# Patient Record
Sex: FEMALE | Race: WHITE | NOT HISPANIC OR LATINO
[De-identification: names, ages, dates, MRNs, and addresses within clinical notes are randomized per-mention and may not be internally consistent; named-entity substitution may affect disease eponyms.]

---

## 2017-03-06 ENCOUNTER — APPOINTMENT (OUTPATIENT)
Dept: OBGYN | Facility: CLINIC | Age: 27
End: 2017-03-06

## 2017-12-16 ENCOUNTER — TRANSCRIPTION ENCOUNTER (OUTPATIENT)
Age: 27
End: 2017-12-16

## 2019-07-03 ENCOUNTER — TRANSCRIPTION ENCOUNTER (OUTPATIENT)
Age: 29
End: 2019-07-03

## 2019-07-16 ENCOUNTER — APPOINTMENT (OUTPATIENT)
Dept: UROLOGY | Facility: CLINIC | Age: 29
End: 2019-07-16
Payer: COMMERCIAL

## 2019-07-16 VITALS
SYSTOLIC BLOOD PRESSURE: 119 MMHG | BODY MASS INDEX: 19.12 KG/M2 | TEMPERATURE: 98.1 F | HEIGHT: 64 IN | HEART RATE: 107 BPM | OXYGEN SATURATION: 97 % | WEIGHT: 112 LBS | DIASTOLIC BLOOD PRESSURE: 82 MMHG

## 2019-07-16 DIAGNOSIS — Z78.9 OTHER SPECIFIED HEALTH STATUS: ICD-10-CM

## 2019-07-16 DIAGNOSIS — Z80.8 FAMILY HISTORY OF MALIGNANT NEOPLASM OF OTHER ORGANS OR SYSTEMS: ICD-10-CM

## 2019-07-16 DIAGNOSIS — R10.32 LEFT LOWER QUADRANT PAIN: ICD-10-CM

## 2019-07-16 DIAGNOSIS — N39.0 URINARY TRACT INFECTION, SITE NOT SPECIFIED: ICD-10-CM

## 2019-07-16 PROCEDURE — 99204 OFFICE O/P NEW MOD 45 MIN: CPT

## 2019-07-16 RX ORDER — DEXTROAMPHETAMINE SACCHARATE, AMPHETAMINE ASPARTATE, DEXTROAMPHETAMINE SULFATE, AND AMPHETAMINE SULFATE 3.75; 3.75; 3.75; 3.75 MG/1; MG/1; MG/1; MG/1
TABLET ORAL
Refills: 0 | Status: ACTIVE | COMMUNITY

## 2019-07-16 RX ORDER — ALPRAZOLAM 2 MG/1
TABLET ORAL
Refills: 0 | Status: ACTIVE | COMMUNITY

## 2019-07-16 NOTE — REVIEW OF SYSTEMS
[Earache] : earache [Abdominal Pain] : abdominal pain [Genital bacterial infection] : genital bacterial infection [Genital yeast infection] : genital yeast infection [Pain during urination] : pain during urination [Blood in urine that you can see] : blood visible in urine [Told you have blood in urine on a urine test] : told blood was present in a urine test [Strong urge to urinate] : strong urge to urinate [Dizziness] : dizziness [Anxiety] : anxiety [Hot Flashes] : hot flashes [Feelings Of Weakness] : feelings of weakness [Swollen Glands] : swollen glands [Negative] : Integumentary

## 2019-07-16 NOTE — LETTER BODY
[Dear  ___] : Dear  [unfilled], [Consult Letter:] : I had the pleasure of evaluating your patient, [unfilled]. [Please see my note below.] : Please see my note below. [Consult Closing:] : Thank you very much for allowing me to participate in the care of this patient.  If you have any questions, please do not hesitate to contact me. [FreeTextEntry1] : ALISSON JENKINS is a 29 year old F who presents with a 1 d h/o GH with passage of blood clots on 7/3/19 and dysuria. Urine culture was negative. Apparently, a few wks prior, though there was no gross hematuria, she had severe pain in pelvis and into vagina with dysuria as well. She passed either debris and/or blood clots and has no known prior h/o stones. Other than 2 wks ago, she has never had hematuria before and denies any childhood, febrile or complicated UTI's and states she had had no UTI at all in over a yr, but states that recently, her GYN tells her the urine looks "terrible" and she often has wbc's etc.\par \par She is otherwise healthy and at baseline has no real bothersome urinary symptoms. There is also no FH of  cancers, stones, ESRD,  anomalies etc.\par \par Patient and I had a lengthy discussion regarding gross hematuria, possible etiologies and the required work up. Due to her GH and pain, she may have a stone and a non contrast CT has been ordered and she will need a cystoscopy.  Appropriate urine tests have been sent and she also is aware that a urine dipstick can be falsely positive with wbc, epithelial cells, even bacteria, that may be normal colonizers. Once this evaluation is complete, should she develop symptoms of  a simple lower urinary tract infection, she should come in for a catheterized specimen for a true microscopic analysis and culture, as the dip in urgicenter was positive and yet there was no growth.\par In 12/17, she had some staph in urine which is often a normal colonizer. She demonstrated understanding of difference between bacilluria with no pyuria and therefore, no infection, and a true acute UTI.\par  [FreeTextEntry3] : Sincerely,\par \par Wilma Tucker MD\par [facility]\par

## 2019-07-16 NOTE — ASSESSMENT
[FreeTextEntry1] : Patient and I had a lengthy discussion regarding gross hematuria, possible etiologies and the required work up. Due to her GH and pain, she may have a stone and a non contrast CT has been ordered and she will need a cystoscopy.  Appropriate urine tests have been sent and she also is aware that a urine dipstick can be falsely positive with wbc, epithelial cells, even bacteria, that may be normal colonizers. Once this evaluation is complete, should she develop symptoms of  a simple lower urinary tract infection, she should come in for a catheterized specimen for a true microscopic analysis and culture, as the dip in urgicenter was positive and yet there was no growth.\par In 12/17, she had some staph in urine which is often a normal colonizer. She demonstrated understanding of difference between bacilluria with no pyuria and therefore, no infection, and a true acute UTI.

## 2019-07-16 NOTE — HISTORY OF PRESENT ILLNESS
[FreeTextEntry1] : ALISSON JENKINS is a 29 year old F who presents with a 1 d h/o GH with passage of blood clots on 7/3/19 and dysuria. Urine culture was negative. Apparently, a few wks prior, though there was no gross hematuria, she had severe pain in pelvis and into vagina with dysuria as well. She passed either debris and/or blood clots and has no known prior h/o stones. Other than 2 wks ago, she has never had hematuria before and denies any childhood, febrile or complicated UTI's and states she had had no UTI at all in over a yr, but states that recently, her GYN tells her the urine looks "terrible" and she often has wbc's etc.\par \par She is otherwise healthy and at baseline has no real bothersome urinary symptoms. There is also no FH of  cancers, stones, ESRD,  anomalies etc.\par

## 2019-07-16 NOTE — PHYSICAL EXAM
[General Appearance - Well Developed] : well developed [General Appearance - Well Nourished] : well nourished [Normal Appearance] : normal appearance [Well Groomed] : well groomed [General Appearance - In No Acute Distress] : no acute distress [Edema] : no peripheral edema [Respiration, Rhythm And Depth] : normal respiratory rhythm and effort [Exaggerated Use Of Accessory Muscles For Inspiration] : no accessory muscle use [Abdomen Soft] : soft [Abdomen Tenderness] : non-tender [Costovertebral Angle Tenderness] : no ~M costovertebral angle tenderness [Urinary Bladder Findings] : the bladder was normal on palpation [Normal Station and Gait] : the gait and station were normal for the patient's age [] : no rash [No Focal Deficits] : no focal deficits [Oriented To Time, Place, And Person] : oriented to person, place, and time [Affect] : the affect was normal [Mood] : the mood was normal [Not Anxious] : not anxious [No Palpable Adenopathy] : no palpable adenopathy return to ED if symptoms worsen, persist or questions arise/radiology results/need for outpatient follow-up

## 2019-07-17 LAB
APPEARANCE: CLEAR
BACTERIA: NEGATIVE
BILIRUBIN URINE: NEGATIVE
BLOOD URINE: NEGATIVE
COLOR: COLORLESS
GLUCOSE QUALITATIVE U: NEGATIVE
HYALINE CASTS: 1 /LPF
KETONES URINE: NEGATIVE
LEUKOCYTE ESTERASE URINE: NEGATIVE
MICROSCOPIC-UA: NORMAL
NITRITE URINE: NEGATIVE
PH URINE: 6.5
PROTEIN URINE: NEGATIVE
RED BLOOD CELLS URINE: 0 /HPF
SPECIFIC GRAVITY URINE: 1
SQUAMOUS EPITHELIAL CELLS: 0 /HPF
URINE CYTOLOGY: NORMAL
UROBILINOGEN URINE: NORMAL
WHITE BLOOD CELLS URINE: 0 /HPF

## 2019-07-18 LAB — BACTERIA UR CULT: NORMAL

## 2019-07-24 ENCOUNTER — APPOINTMENT (OUTPATIENT)
Dept: UROLOGY | Facility: CLINIC | Age: 29
End: 2019-07-24
Payer: COMMERCIAL

## 2019-07-24 VITALS
SYSTOLIC BLOOD PRESSURE: 113 MMHG | DIASTOLIC BLOOD PRESSURE: 82 MMHG | RESPIRATION RATE: 16 BRPM | TEMPERATURE: 98.1 F | HEART RATE: 114 BPM

## 2019-07-24 PROCEDURE — 99211 OFF/OP EST MAY X REQ PHY/QHP: CPT | Mod: 25

## 2019-07-24 PROCEDURE — 52000 CYSTOURETHROSCOPY: CPT

## 2019-07-25 LAB
APPEARANCE: CLEAR
BACTERIA: NEGATIVE
BILIRUBIN URINE: NEGATIVE
BLOOD URINE: NEGATIVE
COLOR: COLORLESS
GLUCOSE QUALITATIVE U: NEGATIVE
HYALINE CASTS: 0 /LPF
KETONES URINE: NEGATIVE
LEUKOCYTE ESTERASE URINE: NEGATIVE
MICROSCOPIC-UA: NORMAL
NITRITE URINE: NEGATIVE
PH URINE: 6.5
PROTEIN URINE: NEGATIVE
RED BLOOD CELLS URINE: 0 /HPF
SPECIFIC GRAVITY URINE: 1.01
SQUAMOUS EPITHELIAL CELLS: 1 /HPF
UROBILINOGEN URINE: NORMAL
WHITE BLOOD CELLS URINE: 0 /HPF

## 2019-07-26 ENCOUNTER — TRANSCRIPTION ENCOUNTER (OUTPATIENT)
Age: 29
End: 2019-07-26

## 2019-07-26 LAB — BACTERIA UR CULT: NORMAL

## 2019-07-29 LAB — URINE CYTOLOGY: NORMAL

## 2019-08-01 ENCOUNTER — FORM ENCOUNTER (OUTPATIENT)
Age: 29
End: 2019-08-01

## 2019-08-02 ENCOUNTER — OUTPATIENT (OUTPATIENT)
Dept: OUTPATIENT SERVICES | Facility: HOSPITAL | Age: 29
LOS: 1 days | End: 2019-08-02
Payer: COMMERCIAL

## 2019-08-02 ENCOUNTER — APPOINTMENT (OUTPATIENT)
Dept: CT IMAGING | Facility: CLINIC | Age: 29
End: 2019-08-02
Payer: COMMERCIAL

## 2019-08-02 DIAGNOSIS — R31.0 GROSS HEMATURIA: ICD-10-CM

## 2019-08-02 PROCEDURE — 74176 CT ABD & PELVIS W/O CONTRAST: CPT | Mod: 26

## 2019-08-02 PROCEDURE — 74176 CT ABD & PELVIS W/O CONTRAST: CPT

## 2019-08-05 PROBLEM — R10.32 LEFT LOWER QUADRANT PAIN: Status: ACTIVE | Noted: 2019-07-16

## 2019-08-22 ENCOUNTER — APPOINTMENT (OUTPATIENT)
Dept: UROLOGY | Facility: CLINIC | Age: 29
End: 2019-08-22
Payer: COMMERCIAL

## 2019-08-22 VITALS
HEART RATE: 86 BPM | DIASTOLIC BLOOD PRESSURE: 68 MMHG | BODY MASS INDEX: 18.78 KG/M2 | HEIGHT: 64 IN | RESPIRATION RATE: 15 BRPM | WEIGHT: 110 LBS | SYSTOLIC BLOOD PRESSURE: 97 MMHG

## 2019-08-22 PROCEDURE — 99214 OFFICE O/P EST MOD 30 MIN: CPT

## 2019-08-22 NOTE — ASSESSMENT
[FreeTextEntry1] : Patient and I had a lengthy discussion regarding her findings. We had sent out 2 sets of urine studies, all with normal results. A noncontrast CT scan was negative and cystoscopy was negative. In our office today the urine is yellow, light and clear, but we will repeat her urine studies, and vaginal swabs were obtained to rule out yeast, BV, trichomonas, Mycoplasma and Ureaplasma. This is very likely vaginal in nature. I suggested she see her gynecologist and maybe there is a role for transvaginal ultrasound. Maybe she has a cervical polyp or possibly it is related to the pill and uterine shedding.  I am unsure as to the etiology of her periodic vaginal bleeding with blood on TP and/or urinary bleeding.

## 2019-08-22 NOTE — LETTER BODY
[Dear  ___] : Dear  [unfilled], [Consult Letter:] : I had the pleasure of evaluating your patient, [unfilled]. [Consult Closing:] : Thank you very much for allowing me to participate in the care of this patient.  If you have any questions, please do not hesitate to contact me. [FreeTextEntry1] : ALISSON JENKINS is a 29 year old F who presented an hr- 2 hrs after we spoke by phone via messaging service.  She was having a panic attack and felt dizzy becausee she kept seeing blood on TP. Last night before bedtime, felt a need to void, with a deep pelvic pain and "itching" sensation. Every time she wiped, there was blood on TP and she kept having to urinate. No passage of blood clots, but then later some blood into urine.\par \par Same OCP's since 2/19. Periods are regular and light. Last 2 days only. She is up to date with gyn and is looking for a new one. Denies n/v/f/c and demonstrated no flank pain. Voided clear yellow urine and c/o continued pruritus. Took one tablet of fluconazole.\par \par Patient and I had a lengthy discussion regarding her findings. We had sent out 2 sets of urine studies, all with normal results. A noncontrast CT scan was negative and cystoscopy was negative. In our office today the urine is yellow, light and clear, but we will repeat her urine studies, and vaginal swabs were obtained to rule out yeast, BV, trichomonas, Mycoplasma and Ureaplasma. This is very likely vaginal in nature. I suggested she see her gynecologist and maybe there is a role for transvaginal ultrasound. Maybe she has a cervical polyp or possibly it is related to the pill and uterine shedding.  I am unsure as to the etiology of her periodic vaginal bleeding with blood on TP and/or urinary bleeding. [FreeTextEntry3] : Sincerely,\par \par Wilma Tucker MD\par [facility]\par

## 2019-08-22 NOTE — PHYSICAL EXAM
[Abdomen Soft] : soft [Abdomen Hernia] : no hernia was discovered [Costovertebral Angle Tenderness] : no ~M costovertebral angle tenderness [Urethral Meatus] : normal urethra [Urinary Bladder Findings] : the bladder was normal on palpation [Vagina] : normal vaginal exam [Cervix] : normal cervix [FreeTextEntry1] : nl vaginal discharge; soft mobile cervix w/ no CMT. No lesions.

## 2019-08-22 NOTE — HISTORY OF PRESENT ILLNESS
[FreeTextEntry1] : ALISSON JENKINS is a 29 year old F who presented an hr- 2 hrs after we spoke by phone via messaging service.  She was having a panic attack and felt dizzy becausee she kept seeing blood on TP. Last night before bedtime, felt a need to void, with a deep pelvic pain and "itching" sensation. Every time she wiped, there was blood on TP and she kept having to urinate. No passage of blood clots, but then later some blood into urine.\par \par Same OCP's since 2/19. Periods are regular and light. Last 2 days only. She is up to date with gyn and is looking for a new one. Denies n/v/f/c and demonstrated no flank pain. Voided clear yellow urine and c/o continued pruritus. Took one tablet of fluconazole.

## 2019-08-23 LAB
APPEARANCE: CLEAR
BACTERIA: NEGATIVE
BILIRUBIN URINE: NEGATIVE
BLOOD URINE: ABNORMAL
COLOR: COLORLESS
GLUCOSE QUALITATIVE U: NEGATIVE
HYALINE CASTS: 0 /LPF
KETONES URINE: ABNORMAL
LEUKOCYTE ESTERASE URINE: ABNORMAL
MICROSCOPIC-UA: NORMAL
NITRITE URINE: NEGATIVE
PH URINE: 6
PROTEIN URINE: NEGATIVE
RED BLOOD CELLS URINE: 1 /HPF
SPECIFIC GRAVITY URINE: 1
SQUAMOUS EPITHELIAL CELLS: 0 /HPF
URINE CYTOLOGY: NORMAL
UROBILINOGEN URINE: NORMAL
WHITE BLOOD CELLS URINE: 28 /HPF

## 2019-08-26 DIAGNOSIS — N76.0 ACUTE VAGINITIS: ICD-10-CM

## 2019-08-26 LAB
BACTERIA GENITAL AEROBE CULT: NORMAL
BACTERIA UR CULT: NORMAL
CANDIDA VAG CYTO: NOT DETECTED
G VAGINALIS+PREV SP MTYP VAG QL MICRO: DETECTED
T VAGINALIS VAG QL WET PREP: NOT DETECTED

## 2019-08-26 RX ORDER — METRONIDAZOLE 500 MG/1
500 TABLET ORAL
Qty: 14 | Refills: 0 | Status: ACTIVE | COMMUNITY
Start: 2019-08-26 | End: 1900-01-01

## 2019-08-27 NOTE — LETTER BODY
[Dear  ___] : Dear  [unfilled], [Consult Letter:] : I had the pleasure of evaluating your patient, [unfilled]. [Please see my note below.] : Please see my note below. [Consult Closing:] : Thank you very much for allowing me to participate in the care of this patient.  If you have any questions, please do not hesitate to contact me. [FreeTextEntry1] : As you know, I had met your patient after an episode of gross hematuria. On 7/16/19, her microscopic analysis, culture and cytology were negative and cystoscopy today was normal. We are awaiting her CT scan to r/o any stones. \par \par I will repeat urine studies as she is quite anxious about it. It is very possible that she had passed a small stone or crystals and at this point, there would be no evidence of recent passage.  She also knows to contact me right away if she has an episode of gross bleeding again.\par \par I will keep you apprised of any further findings. [FreeTextEntry3] : Sincerely,\par \par Wilma Tucker MD\par [facility]\par

## 2019-08-27 NOTE — PHYSICAL EXAM
[Abdomen Soft] : soft [Costovertebral Angle Tenderness] : no ~M costovertebral angle tenderness [Urethral Meatus] : normal urethra [Urinary Bladder Findings] : the bladder was normal on palpation [Vagina] : normal vaginal exam [Cervix] : normal cervix [FreeTextEntry1] : nl vaginal discharge; no blood on finger with bimanual

## 2019-09-05 DIAGNOSIS — Z71.1 PERSON WITH FEARED HEALTH COMPLAINT IN WHOM NO DIAGNOSIS IS MADE: ICD-10-CM

## 2019-09-05 LAB
MYCOPLASMA HOMINIS CULTURE: NORMAL
UREA SPECIMEN SOURCE: NORMAL
UREAPLASMA CULTURE: ABNORMAL
UREAPLASMA, PRELIMINARY CULTURE: NORMAL

## 2019-09-05 RX ORDER — DOXYCYCLINE 100 MG/1
100 TABLET, FILM COATED ORAL
Qty: 20 | Refills: 0 | Status: ACTIVE | COMMUNITY
Start: 2019-09-05 | End: 1900-01-01

## 2020-10-01 ENCOUNTER — TRANSCRIPTION ENCOUNTER (OUTPATIENT)
Age: 30
End: 2020-10-01

## 2020-10-05 ENCOUNTER — APPOINTMENT (OUTPATIENT)
Dept: UROLOGY | Facility: CLINIC | Age: 30
End: 2020-10-05
Payer: COMMERCIAL

## 2020-10-05 VITALS
SYSTOLIC BLOOD PRESSURE: 121 MMHG | DIASTOLIC BLOOD PRESSURE: 83 MMHG | RESPIRATION RATE: 20 BRPM | HEIGHT: 64 IN | BODY MASS INDEX: 18.78 KG/M2 | HEART RATE: 121 BPM | WEIGHT: 110 LBS

## 2020-10-05 VITALS — TEMPERATURE: 97.4 F

## 2020-10-05 PROCEDURE — 99214 OFFICE O/P EST MOD 30 MIN: CPT

## 2020-10-06 NOTE — HISTORY OF PRESENT ILLNESS
[FreeTextEntry1] : 29yo female with cc of gross hematuria. Pt was seen last year for this. Had CT and cysto that was negative. Had similar episode again this summer and again only noted blood with urination--none on wiping. Saw gyn and had negative eval. Sx resolved within one day. Again had blood last week. Lasted all day and then resolved. Two days prior saw blood when she wiped. After multiple times of voiding blood, will then get some terminal dysuria but no change in frequency and urgency and cultures have been negative by report. \par \par No tobacco hx. Smokes marijuana daily. No exposure hx. No family hx of  malignancy. No sickle cell or thalassemias. No NSAID use. Has gotten period shortly after episode--No hx of endometriosis or painful periods. No excessive exercise.

## 2020-10-06 NOTE — ASSESSMENT
[FreeTextEntry1] : ? gross hematuria. Sx have been recurrent and by hx sound like true hematuria. Needs eval at time of sx given recent negative CTU and cysto. \par --, UA/UCx, GYN and cysto at the time of bleeding to localize

## 2020-10-06 NOTE — PHYSICAL EXAM
[General Appearance - Well Developed] : well developed [General Appearance - Well Nourished] : well nourished [General Appearance - In No Acute Distress] : no acute distress [Abdomen Soft] : soft [Abdomen Tenderness] : non-tender [Costovertebral Angle Tenderness] : no ~M costovertebral angle tenderness [Edema] : no peripheral edema [Exaggerated Use Of Accessory Muscles For Inspiration] : no accessory muscle use [Oriented To Time, Place, And Person] : oriented to person, place, and time [Normal Station and Gait] : the gait and station were normal for the patient's age [No Focal Deficits] : no focal deficits [Cervical Lymph Nodes Enlarged Anterior Bilaterally] : anterior cervical [Supraclavicular Lymph Nodes Enlarged Bilaterally] : supraclavicular

## 2020-10-28 ENCOUNTER — RESULT REVIEW (OUTPATIENT)
Age: 30
End: 2020-10-28

## 2020-10-31 ENCOUNTER — NON-APPOINTMENT (OUTPATIENT)
Age: 30
End: 2020-10-31

## 2021-09-09 ENCOUNTER — APPOINTMENT (OUTPATIENT)
Dept: UROLOGY | Facility: CLINIC | Age: 31
End: 2021-09-09
Payer: COMMERCIAL

## 2021-09-09 VITALS — HEART RATE: 78 BPM | DIASTOLIC BLOOD PRESSURE: 82 MMHG | SYSTOLIC BLOOD PRESSURE: 119 MMHG

## 2021-09-09 VITALS — DIASTOLIC BLOOD PRESSURE: 63 MMHG | TEMPERATURE: 98.1 F | SYSTOLIC BLOOD PRESSURE: 87 MMHG

## 2021-09-09 VITALS
DIASTOLIC BLOOD PRESSURE: 80 MMHG | BODY MASS INDEX: 18.88 KG/M2 | TEMPERATURE: 98.1 F | SYSTOLIC BLOOD PRESSURE: 112 MMHG | OXYGEN SATURATION: 100 % | WEIGHT: 110 LBS | HEART RATE: 94 BPM

## 2021-09-09 DIAGNOSIS — F98.8 OTHER SPECIFIED BEHAVIORAL AND EMOTIONAL DISORDERS WITH ONSET USUALLY OCCURRING IN CHILDHOOD AND ADOLESCENCE: ICD-10-CM

## 2021-09-09 DIAGNOSIS — F41.9 ANXIETY DISORDER, UNSPECIFIED: ICD-10-CM

## 2021-09-09 DIAGNOSIS — R82.71 BACTERIURIA: ICD-10-CM

## 2021-09-09 PROCEDURE — 99213 OFFICE O/P EST LOW 20 MIN: CPT | Mod: 25

## 2021-09-09 PROCEDURE — 52000 CYSTOURETHROSCOPY: CPT

## 2021-09-09 RX ORDER — CIPROFLOXACIN HYDROCHLORIDE 500 MG/1
500 TABLET, FILM COATED ORAL
Qty: 2 | Refills: 0 | Status: ACTIVE | COMMUNITY
Start: 2021-09-09

## 2021-09-09 RX ORDER — CIPROFLOXACIN HYDROCHLORIDE 500 MG/1
500 TABLET, FILM COATED ORAL
Qty: 10 | Refills: 0 | Status: ACTIVE | COMMUNITY
Start: 2021-09-09 | End: 1900-01-01

## 2021-09-09 RX ORDER — PHENAZOPYRIDINE HYDROCHLORIDE 100 MG/1
100 TABLET ORAL EVERY 6 HOURS
Qty: 2 | Refills: 0 | Status: ACTIVE | COMMUNITY
Start: 2021-09-09

## 2021-09-09 RX ORDER — NORETHINDRONE ACETATE, ETHINYL ESTRADIOL AND FERROUS FUMARATE 1MG-20(24)
1-20 KIT ORAL
Refills: 0 | Status: DISCONTINUED | COMMUNITY
End: 2021-09-09

## 2021-09-09 RX ADMIN — PHENAZOPYRIDINE HYDROCHLORIDE 0 MG: 100 TABLET, FILM COATED ORAL at 00:00

## 2021-09-09 RX ADMIN — CIPROFLOXACIN HYDROCHLORIDE 0 MG: 500 TABLET, FILM COATED ORAL at 00:00

## 2021-09-09 NOTE — ASSESSMENT
[FreeTextEntry1] : needs UA, Cx and cytology \par CTU - Rx given\par cystoscopy but urine already clearing, so concerned I won't see what side it is coming from: very angry looking bladder, ?inflammatory\par \par Urine for fungal cx as well due to some "snow" and debris in bladder.\par \par ? IC - may need bladder biopsies if all of above negative\par F/u 3-4 wks

## 2021-09-09 NOTE — LETTER BODY
[Dear  ___] : Dear  [unfilled], [Consult Letter:] : I had the pleasure of evaluating your patient, [unfilled]. [Please see my note below.] : Please see my note below. [Consult Closing:] : Thank you very much for allowing me to participate in the care of this patient.  If you have any questions, please do not hesitate to contact me. [FreeTextEntry1] : Please see my note. I will keep you informed of any positive findings. [FreeTextEntry3] : Sincerely,\par \par Wilma Tucker MD\par Clinical \par [facility]\par [facility]\par

## 2021-09-09 NOTE — HISTORY OF PRESENT ILLNESS
[FreeTextEntry1] : ALISSON JENKINS is a 31 year old F who presents emergently with pelvic pain starting at 0400 and then voided pinkish/red urine at 0800. This occurred 3 or 4 x's today and now is mostly yellow.\par \par No bleeding or clotting disorders. Periods are regular, long and heavy, 1 yr after coming off OCP's. \par Up to date w Gyn and PAP's normal. One partner for last 7 yrs.\par \par Has had 3 negative urine cultures in the last yr, but gets sx's, gross blood, has a dip done and is started on abx. Multiple times, she was almost through the entire course and would receive call that urine cx negative.\par \par She wishes to have a cystoscopy even though at present urine is only slightly pink tinged. \par

## 2021-09-12 ENCOUNTER — NON-APPOINTMENT (OUTPATIENT)
Age: 31
End: 2021-09-12

## 2021-09-12 LAB
APPEARANCE: CLEAR
BACTERIA: NEGATIVE
BILIRUBIN URINE: NEGATIVE
BLOOD URINE: ABNORMAL
COLOR: COLORLESS
GLUCOSE QUALITATIVE U: NEGATIVE
HYALINE CASTS: 1 /LPF
KETONES URINE: NEGATIVE
LEUKOCYTE ESTERASE URINE: ABNORMAL
MICROSCOPIC-UA: NORMAL
NITRITE URINE: NEGATIVE
PH URINE: 7
PROTEIN URINE: NEGATIVE
RED BLOOD CELLS URINE: 2 /HPF
SPECIFIC GRAVITY URINE: 1
SQUAMOUS EPITHELIAL CELLS: 1 /HPF
URINE CYTOLOGY: NORMAL
UROBILINOGEN URINE: NORMAL
WHITE BLOOD CELLS URINE: 12 /HPF

## 2021-09-15 ENCOUNTER — OUTPATIENT (OUTPATIENT)
Dept: OUTPATIENT SERVICES | Facility: HOSPITAL | Age: 31
LOS: 1 days | End: 2021-09-15
Payer: COMMERCIAL

## 2021-09-15 ENCOUNTER — APPOINTMENT (OUTPATIENT)
Dept: CT IMAGING | Facility: CLINIC | Age: 31
End: 2021-09-15
Payer: COMMERCIAL

## 2021-09-15 DIAGNOSIS — R31.0 GROSS HEMATURIA: ICD-10-CM

## 2021-09-15 DIAGNOSIS — Z00.8 ENCOUNTER FOR OTHER GENERAL EXAMINATION: ICD-10-CM

## 2021-09-15 PROCEDURE — 74178 CT ABD&PLV WO CNTR FLWD CNTR: CPT | Mod: 26

## 2021-09-15 PROCEDURE — 74178 CT ABD&PLV WO CNTR FLWD CNTR: CPT

## 2021-09-21 LAB — HLX UV FISH FINAL REPORT: NORMAL

## 2021-09-23 LAB — FUNGUS SPEC CULT ORG #8: NORMAL

## 2021-10-13 ENCOUNTER — APPOINTMENT (OUTPATIENT)
Dept: UROLOGY | Facility: CLINIC | Age: 31
End: 2021-10-13
Payer: COMMERCIAL

## 2021-10-13 VITALS
OXYGEN SATURATION: 98 % | BODY MASS INDEX: 17.93 KG/M2 | HEART RATE: 95 BPM | TEMPERATURE: 96.7 F | DIASTOLIC BLOOD PRESSURE: 68 MMHG | SYSTOLIC BLOOD PRESSURE: 101 MMHG | WEIGHT: 105 LBS | HEIGHT: 64 IN

## 2021-10-13 DIAGNOSIS — N30.10 INTERSTITIAL CYSTITIS (CHRONIC) W/OUT HEMATURIA: ICD-10-CM

## 2021-10-13 DIAGNOSIS — R39.89 OTHER SYMPTOMS AND SIGNS INVOLVING THE GENITOURINARY SYSTEM: ICD-10-CM

## 2021-10-13 DIAGNOSIS — R35.0 FREQUENCY OF MICTURITION: ICD-10-CM

## 2021-10-13 DIAGNOSIS — R31.0 GROSS HEMATURIA: ICD-10-CM

## 2021-10-13 PROCEDURE — 99213 OFFICE O/P EST LOW 20 MIN: CPT

## 2021-10-13 RX ORDER — CEPHALEXIN 250 MG/1
250 CAPSULE ORAL
Qty: 60 | Refills: 2 | Status: ACTIVE | COMMUNITY
Start: 2021-10-13 | End: 1900-01-01

## 2021-10-13 NOTE — HISTORY OF PRESENT ILLNESS
[FreeTextEntry1] : ALISSON JENKINS is a 31 year old F who presents with urgency and frequency. She has been asymptomatic since last month and is surprised as she is much more sexually active now, as she is trying to conceive.  There is no dysuria, pain or hematuria. The pictures taken at last cysto to share with Dr. Perez, did not save to system.\par \par Apparently, she has been trying to conceive for 1 yr\par Seeing Gyn in November.\par \par Reports an occ twinge/ pain in the urethra that subsides in 1-2 min on it's own.\par \par \par

## 2021-10-13 NOTE — LETTER BODY
[Dear  ___] : Dear  [unfilled], [Consult Letter:] : I had the pleasure of evaluating your patient, [unfilled]. [Please see my note below.] : Please see my note below. [Consult Closing:] : Thank you very much for allowing me to participate in the care of this patient.  If you have any questions, please do not hesitate to contact me. [FreeTextEntry1] : Please see my note. I will keep you informed. [FreeTextEntry3] : Sincerely,\par \par Wilma Tucker MD\par Clinical \par [facility]\par [facility]\par

## 2021-10-13 NOTE — ASSESSMENT
[FreeTextEntry1] : Reviewed all studies from last few visits again:   I do suspect she has IC and would like her to see Dr. Perez.\par \par Fungal and bacterial cx neg. \par Cytology and FISH neg\par CT urogram normal\par cysto - normal first time and then very irritated bladder with debris and punctate hemorrhagic areas\par \par \par Again, suggested PFT.\par Will start her on post coital abx: Keflex prn\par See Dr. Perez for consult\par Will hold on OAB meds as she doesn't want to take more medication or a new med until seen by Gyn and possibly a reproductive endocrinologist.\par \par Consider Elmiron and/or bladder instillations: will speak w Dr. Perez.

## 2021-10-13 NOTE — PHYSICAL EXAM
[Abdomen Soft] : soft [Costovertebral Angle Tenderness] : no ~M costovertebral angle tenderness [Urethral Meatus] : normal urethra [Urinary Bladder Findings] : the bladder was normal on palpation

## 2021-12-01 ENCOUNTER — RESULT REVIEW (OUTPATIENT)
Age: 31
End: 2021-12-01

## 2022-02-17 ENCOUNTER — ASOB RESULT (OUTPATIENT)
Age: 32
End: 2022-02-17

## 2022-02-17 ENCOUNTER — APPOINTMENT (OUTPATIENT)
Dept: ANTEPARTUM | Facility: CLINIC | Age: 32
End: 2022-02-17
Payer: COMMERCIAL

## 2022-02-17 PROCEDURE — 76811 OB US DETAILED SNGL FETUS: CPT

## 2022-03-07 ENCOUNTER — APPOINTMENT (OUTPATIENT)
Dept: ANTEPARTUM | Facility: CLINIC | Age: 32
End: 2022-03-07
Payer: COMMERCIAL

## 2022-03-07 ENCOUNTER — ASOB RESULT (OUTPATIENT)
Age: 32
End: 2022-03-07

## 2022-03-07 PROCEDURE — 76816 OB US FOLLOW-UP PER FETUS: CPT

## 2022-07-08 ENCOUNTER — OUTPATIENT (OUTPATIENT)
Dept: OUTPATIENT SERVICES | Facility: HOSPITAL | Age: 32
LOS: 1 days | End: 2022-07-08
Payer: COMMERCIAL

## 2022-07-08 VITALS
TEMPERATURE: 98 F | SYSTOLIC BLOOD PRESSURE: 107 MMHG | DIASTOLIC BLOOD PRESSURE: 72 MMHG | OXYGEN SATURATION: 99 % | RESPIRATION RATE: 18 BRPM | HEART RATE: 105 BPM

## 2022-07-08 VITALS
RESPIRATION RATE: 18 BRPM | HEART RATE: 80 BPM | TEMPERATURE: 98 F | DIASTOLIC BLOOD PRESSURE: 77 MMHG | SYSTOLIC BLOOD PRESSURE: 110 MMHG

## 2022-07-08 DIAGNOSIS — Z3A.00 WEEKS OF GESTATION OF PREGNANCY NOT SPECIFIED: ICD-10-CM

## 2022-07-08 DIAGNOSIS — O26.899 OTHER SPECIFIED PREGNANCY RELATED CONDITIONS, UNSPECIFIED TRIMESTER: ICD-10-CM

## 2022-07-08 PROCEDURE — 99214 OFFICE O/P EST MOD 30 MIN: CPT

## 2022-07-08 PROCEDURE — G0463: CPT

## 2022-07-08 PROCEDURE — 59025 FETAL NON-STRESS TEST: CPT

## 2022-07-08 PROCEDURE — 76818 FETAL BIOPHYS PROFILE W/NST: CPT | Mod: 26

## 2022-07-08 NOTE — OB PROVIDER TRIAGE NOTE - ADDITIONAL INSTRUCTIONS
A/P 32y P0  @ 40w 4d without evidence of ruptured membranes  - d/c home  - daily FKC  - increase hydration  - labor precautions    D/W Dr Victorino VILLASENORC

## 2022-07-08 NOTE — OB PROVIDER TRIAGE NOTE - HISTORY OF PRESENT ILLNESS
32y P0     @ 40w 4d      presents c/o gush of clear fluid at 3am and then again at 6 am, small amount of spotting when she wiped.  Denies contractions has + FM    PNC: Dr Lake  PNI: uncomplicated  PNL: GBS negative  Prenatal Labs reviewed: Hept B negative, HIV negative, GBS negative    All: NKDA  Meds: PNV  PMHx: Interstitial cystitis  PSHx: Denies  Socialhx: Denies x 3  OBhx: Primigravid  GYNhx: Denies fibroids, ov cysts or STDs    T(C): 36.7 (07-08-22 @ 09:31), Max: 36.7 (07-08-22 @ 09:27)  HR: 92 (07-08-22 @ 09:37) (92 - 100)  BP: 107/72 (07-08-22 @ 09:31) (107/72 - 107/72)  RR: 18 (07-08-22 @ 09:31) (18 - 18)  SpO2: 97% (07-08-22 @ 09:37) (97% - 99%)    Gen: NAD  Heart: RRR  Lungs: CTA B/L  Abdomen: Gravid, NT  Ext: no calf tenderness    NST: 130's moderate variablity + accels no decels  TOCO: irregular  VE:   EFW: 8lbs  7 oz on 7/5  BSUS:    A/P 32y P 0  @ 40w 4d    presents with  - Fetal status -   - GBS     D/W    Nidia Pierre PA-C     32y P0  @ 40w 4d      presents c/o gush of clear fluid at 3am and then again at 6 am, small amount of spotting when she wiped.  Denies contractions has + FM    PNC: Dr Lake  PNI: uncomplicated  PNL: GBS negative  Prenatal Labs reviewed: Hept B negative, HIV negative, GBS negative    All: NKDA  Meds: PNV  PMHx: Interstitial cystitis  PSHx: Denies  Socialhx: Denies x 3  OBhx: Primigravid  GYNhx: Denies fibroids, ov cysts or STDs    T(C): 36.7 (07-08-22 @ 09:31), Max: 36.7 (07-08-22 @ 09:27)  HR: 92 (07-08-22 @ 09:37) (92 - 100)  BP: 107/72 (07-08-22 @ 09:31) (107/72 - 107/72)  RR: 18 (07-08-22 @ 09:31) (18 - 18)  SpO2: 97% (07-08-22 @ 09:37) (97% - 99%)    Gen: NAD  Heart: RRR  Lungs: CTA B/L  Abdomen: Gravid, NT  Ext: no calf tenderness    NST: 130's moderate variablity + accels no decels  TOCO: irregular  VE: 1/50/-3  SSE: neg pooling, neg ferning, neg nitrazine  EFW: 8lbs  7 oz on 7/5  BSUS:    A/P 32y P 0  @ 40w 4d    presents with  - Fetal status -   - GBS     D/W    Nidia Pierre PA-C     32y P0  @ 40w 4d      presents c/o gush of clear fluid at 3am and then again at 6 am, small amount of spotting when she wiped.  Denies contractions has + FM    PNC: Dr Lake  PNI: uncomplicated  PNL: GBS negative    All: NKDA  Meds: PNV  PMHx: Interstitial cystitis  PSHx: Denies  Socialhx: Denies x 3  OBhx: Primigravid  GYNhx: Denies fibroids, ov cysts or STDs    T(C): 36.7 (07-08-22 @ 09:31), Max: 36.7 (07-08-22 @ 09:27)  HR: 92 (07-08-22 @ 09:37) (92 - 100)  BP: 107/72 (07-08-22 @ 09:31) (107/72 - 107/72)  RR: 18 (07-08-22 @ 09:31) (18 - 18)  SpO2: 97% (07-08-22 @ 09:37) (97% - 99%)    Gen: NAD  Heart: RRR  Lungs: CTA B/L  Abdomen: Gravid, NT  Ext: no calf tenderness    NST: 130's moderate variablity + accels no decels  TOCO: irregular  VE: 1/50/-3  SSE: neg pooling, neg ferning, neg nitrazine  EFW: 8lbs  7 oz on 7/5  BSUS: vtx, ARLEN 10.7  BPP 8/8

## 2022-07-09 ENCOUNTER — INPATIENT (INPATIENT)
Facility: HOSPITAL | Age: 32
LOS: 1 days | Discharge: HOME | End: 2022-07-11
Attending: OBSTETRICS & GYNECOLOGY | Admitting: OBSTETRICS & GYNECOLOGY
Payer: COMMERCIAL

## 2022-07-09 ENCOUNTER — OUTPATIENT (OUTPATIENT)
Dept: OUTPATIENT SERVICES | Facility: HOSPITAL | Age: 32
LOS: 1 days | End: 2022-07-09
Payer: COMMERCIAL

## 2022-07-09 VITALS — HEART RATE: 95 BPM | OXYGEN SATURATION: 98 %

## 2022-07-09 DIAGNOSIS — Z34.80 ENCOUNTER FOR SUPERVISION OF OTHER NORMAL PREGNANCY, UNSPECIFIED TRIMESTER: ICD-10-CM

## 2022-07-09 DIAGNOSIS — Z3A.00 WEEKS OF GESTATION OF PREGNANCY NOT SPECIFIED: ICD-10-CM

## 2022-07-09 DIAGNOSIS — O26.899 OTHER SPECIFIED PREGNANCY RELATED CONDITIONS, UNSPECIFIED TRIMESTER: ICD-10-CM

## 2022-07-09 DIAGNOSIS — Z11.52 ENCOUNTER FOR SCREENING FOR COVID-19: ICD-10-CM

## 2022-07-09 LAB
AMNISURE ROM (RUPTURE OF MEMBRANES): POSITIVE
BASOPHILS # BLD AUTO: 0.02 K/UL — SIGNIFICANT CHANGE UP (ref 0–0.2)
BASOPHILS NFR BLD AUTO: 0.2 % — SIGNIFICANT CHANGE UP (ref 0–2)
BLD GP AB SCN SERPL QL: NEGATIVE — SIGNIFICANT CHANGE UP
EOSINOPHIL # BLD AUTO: 0.1 K/UL — SIGNIFICANT CHANGE UP (ref 0–0.5)
EOSINOPHIL NFR BLD AUTO: 0.8 % — SIGNIFICANT CHANGE UP (ref 0–6)
HCT VFR BLD CALC: 41.2 % — SIGNIFICANT CHANGE UP (ref 34.5–45)
HGB BLD-MCNC: 13.8 G/DL — SIGNIFICANT CHANGE UP (ref 11.5–15.5)
IMM GRANULOCYTES NFR BLD AUTO: 0.7 % — SIGNIFICANT CHANGE UP (ref 0–1.5)
LYMPHOCYTES # BLD AUTO: 1.27 K/UL — SIGNIFICANT CHANGE UP (ref 1–3.3)
LYMPHOCYTES # BLD AUTO: 10.5 % — LOW (ref 13–44)
MCHC RBC-ENTMCNC: 31.3 PG — SIGNIFICANT CHANGE UP (ref 27–34)
MCHC RBC-ENTMCNC: 33.5 GM/DL — SIGNIFICANT CHANGE UP (ref 32–36)
MCV RBC AUTO: 93.4 FL — SIGNIFICANT CHANGE UP (ref 80–100)
MONOCYTES # BLD AUTO: 0.86 K/UL — SIGNIFICANT CHANGE UP (ref 0–0.9)
MONOCYTES NFR BLD AUTO: 7.1 % — SIGNIFICANT CHANGE UP (ref 2–14)
NEUTROPHILS # BLD AUTO: 9.75 K/UL — HIGH (ref 1.8–7.4)
NEUTROPHILS NFR BLD AUTO: 80.7 % — HIGH (ref 43–77)
NRBC # BLD: 0 /100 WBCS — SIGNIFICANT CHANGE UP (ref 0–0)
PLATELET # BLD AUTO: 180 K/UL — SIGNIFICANT CHANGE UP (ref 150–400)
RBC # BLD: 4.41 M/UL — SIGNIFICANT CHANGE UP (ref 3.8–5.2)
RBC # FLD: 12.4 % — SIGNIFICANT CHANGE UP (ref 10.3–14.5)
RH IG SCN BLD-IMP: POSITIVE — SIGNIFICANT CHANGE UP
SARS-COV-2 RNA SPEC QL NAA+PROBE: SIGNIFICANT CHANGE UP
WBC # BLD: 12.08 K/UL — HIGH (ref 3.8–10.5)
WBC # FLD AUTO: 12.08 K/UL — HIGH (ref 3.8–10.5)

## 2022-07-09 PROCEDURE — U0005: CPT

## 2022-07-09 PROCEDURE — U0003: CPT

## 2022-07-09 PROCEDURE — C9803: CPT

## 2022-07-09 RX ORDER — CITRIC ACID/SODIUM CITRATE 300-500 MG
15 SOLUTION, ORAL ORAL EVERY 6 HOURS
Refills: 0 | Status: DISCONTINUED | OUTPATIENT
Start: 2022-07-09 | End: 2022-07-10

## 2022-07-09 RX ORDER — OXYTOCIN 10 UNIT/ML
333.33 VIAL (ML) INJECTION
Qty: 20 | Refills: 0 | Status: COMPLETED | OUTPATIENT
Start: 2022-07-09 | End: 2022-07-10

## 2022-07-09 RX ORDER — CHLORHEXIDINE GLUCONATE 213 G/1000ML
1 SOLUTION TOPICAL ONCE
Refills: 0 | Status: DISCONTINUED | OUTPATIENT
Start: 2022-07-09 | End: 2022-07-10

## 2022-07-09 RX ORDER — SODIUM CHLORIDE 9 MG/ML
1000 INJECTION, SOLUTION INTRAVENOUS
Refills: 0 | Status: DISCONTINUED | OUTPATIENT
Start: 2022-07-09 | End: 2022-07-10

## 2022-07-09 RX ADMIN — SODIUM CHLORIDE 125 MILLILITER(S): 9 INJECTION, SOLUTION INTRAVENOUS at 22:16

## 2022-07-09 RX ADMIN — SODIUM CHLORIDE 125 MILLILITER(S): 9 INJECTION, SOLUTION INTRAVENOUS at 23:29

## 2022-07-09 NOTE — OB PROVIDER TRIAGE NOTE - NSOBPROVIDERNOTE_OBGYN_ALL_OB_FT
Pt is a 31y/o  at 40w5d presenting to triage with painful contractions and leakage of fluid x 1 day. Nitrazine and ferning +, ARLEN noted to be 15.49 however.     - f/u [facility]     Discussed with Dr. Maurice Urbina PGY-2

## 2022-07-09 NOTE — OB RN PATIENT PROFILE - FALL HARM RISK - UNIVERSAL INTERVENTIONS
Bed in lowest position, wheels locked, appropriate side rails in place/Call bell, personal items and telephone in reach/Instruct patient to call for assistance before getting out of bed or chair/Non-slip footwear when patient is out of bed/Towner to call system/Physically safe environment - no spills, clutter or unnecessary equipment/Purposeful Proactive Rounding/Room/bathroom lighting operational, light cord in reach

## 2022-07-09 NOTE — OB PROVIDER H&P - ASSESSMENT
A&P:   Labor: admit to L&D  -buccal cytotec  -routine labs  -EFM/toco  -CLD, IV hydration  Fetal: cat 1 tracing, fetal status reassuring  GBS: neg  Analgesia: epidural PRN       Discussed with Dr. Maurice Urbina PGY-3   A&P:   Labor: admit to L&D  -buccal cytotec  -routine labs  -EFM/toco  -CLD, IV hydration  Fetal: cat 1 tracing, fetal status reassuring  GBS: neg  Analgesia: epidural PRN       Discussed with Dr. Maurice Urbina PGY-3    OB attending admitted last night, case reviewed  possible high leak since Friday - confirmed ruptured with amniosure - forebag noted  seen and examined     Eugenia Richards

## 2022-07-09 NOTE — PRE-ANESTHESIA EVALUATION ADULT - NSANTHPMHFT_GEN_ALL_CORE
40.5 weeks gestation; no cxs;  interstitial cystitis; anxiety(previously on Xanax prior to pregnancy).

## 2022-07-09 NOTE — OB PROVIDER H&P - HISTORY OF PRESENT ILLNESS
OB R3 H&P  Pt is a 31y/o  at 40w5d presenting to triage with painful contractions and leakage of fluid x 1 day. Reports contractions have been q5 min and 8/10 pain since 3:30pm. Patient reports she has had small gushes of initially clear fluid, now blood tinged. +FM.   Prenatal course unciomplicated  GBS negative  EFW 3900 (3827 g  sono)    OBHx: no complications  GynHx: denies h/o abnormal paps, STI's, fibroids, cysts  PMHx: interstitial cystitis  PSHx: denies  Med: PNV  All: NKDA  SH: denies alcohol, tobacco, or drug use  Psych: + anxiety, previously on Xanax prior to pregnancy.

## 2022-07-09 NOTE — OB PROVIDER TRIAGE NOTE - HISTORY OF PRESENT ILLNESS
OB R3 Triage Note     Pt is a 31y/o  at 40w5d presenting to triage with painful contractions and leakage of fluid x 1 day. Reports contractions have been q5 min and 8/10 pain since 3:30pm. Patient reports she has had small gushes of initially clear fluid, now blood tinged. +FM.   Prenatal course unciomplicated  GBS negative  EFW 3900 (3827 g  sono)    OBHx: no complications  GynHx: denies h/o abnormal paps, STI's, fibroids, cysts  PMHx: interstitial cystitis  PSHx: denies  Med: PNV  All: NKDA  SH: denies alcohol, tobacco, or drug use  Psych: + anxiety, previously on Xanax prior to pregnancy.

## 2022-07-09 NOTE — OB PROVIDER TRIAGE NOTE - NSHPPHYSICALEXAM_GEN_ALL_CORE
Vital Signs Last 24 Hrs  T(C): 36.9 (09 Jul 2022 18:55), Max: 36.9 (09 Jul 2022 18:55)  T(F): 98.42 (09 Jul 2022 18:55), Max: 98.42 (09 Jul 2022 18:55)  HR: 69 (09 Jul 2022 20:24) (69 - 108)  BP: 118/76 (09 Jul 2022 18:55) (118/76 - 118/76)  BP(mean): --  RR: 18 (09 Jul 2022 18:55) (18 - 18)  SpO2: 96% (09 Jul 2022 20:24) (96% - 99%)    General: well appearing, uncomfortable appearing with contractions  EFH: baseline 130s, moderate variability, accelerations present, no decels  Corn: q3-5 min  VE: 1.5/50/-3  Speculum: no pooling, nitrazine + (blood), ferning +  TAUS: vertex, ARLEN 15.49
No

## 2022-07-09 NOTE — PRE-ANESTHESIA EVALUATION ADULT - NSANTHOSAYNRD_GEN_A_CORE
No. HOLDEN screening performed.  STOP BANG Legend: 0-2 = LOW Risk; 3-4 = INTERMEDIATE Risk; 5-8 = HIGH Risk

## 2022-07-09 NOTE — OB PROVIDER H&P - NSHPPHYSICALEXAM_GEN_ALL_CORE
Vital Signs Last 24 Hrs  T(C): 36.9 (09 Jul 2022 18:55), Max: 36.9 (09 Jul 2022 18:55)  T(F): 98.42 (09 Jul 2022 18:55), Max: 98.42 (09 Jul 2022 18:55)  HR: 90 (09 Jul 2022 20:49) (69 - 108)  BP: 118/76 (09 Jul 2022 18:55) (118/76 - 118/76)  BP(mean): --  RR: 18 (09 Jul 2022 18:55) (18 - 18)  SpO2: 99% (09 Jul 2022 20:49) (94% - 100%)    General: well appearing, uncomfortable appearing with contractions  EFH: baseline 130s, moderate variability, accelerations present, no decels  [place]: q3-5 min  VE: 1.5/50/-3  Speculum: no pooling, nitrazine + (blood), ferning +  TAUS: vertex, ARLEN 15.49

## 2022-07-10 ENCOUNTER — TRANSCRIPTION ENCOUNTER (OUTPATIENT)
Age: 32
End: 2022-07-10

## 2022-07-10 LAB
COVID-19 SPIKE DOMAIN AB INTERP: POSITIVE
COVID-19 SPIKE DOMAIN ANTIBODY RESULT: >250 U/ML — HIGH
SARS-COV-2 IGG+IGM SERPL QL IA: >250 U/ML — HIGH
SARS-COV-2 IGG+IGM SERPL QL IA: POSITIVE
T PALLIDUM AB TITR SER: NEGATIVE — SIGNIFICANT CHANGE UP

## 2022-07-10 RX ORDER — OXYCODONE HYDROCHLORIDE 5 MG/1
5 TABLET ORAL
Refills: 0 | Status: DISCONTINUED | OUTPATIENT
Start: 2022-07-10 | End: 2022-07-11

## 2022-07-10 RX ORDER — SODIUM CHLORIDE 9 MG/ML
3 INJECTION INTRAMUSCULAR; INTRAVENOUS; SUBCUTANEOUS EVERY 8 HOURS
Refills: 0 | Status: DISCONTINUED | OUTPATIENT
Start: 2022-07-10 | End: 2022-07-11

## 2022-07-10 RX ORDER — IBUPROFEN 200 MG
1 TABLET ORAL
Qty: 0 | Refills: 0 | DISCHARGE
Start: 2022-07-10

## 2022-07-10 RX ORDER — OXYTOCIN 10 UNIT/ML
4 VIAL (ML) INJECTION
Qty: 30 | Refills: 0 | Status: DISCONTINUED | OUTPATIENT
Start: 2022-07-10 | End: 2022-07-10

## 2022-07-10 RX ORDER — SODIUM CHLORIDE 9 MG/ML
1000 INJECTION, SOLUTION INTRAVENOUS
Refills: 0 | Status: DISCONTINUED | OUTPATIENT
Start: 2022-07-10 | End: 2022-07-11

## 2022-07-10 RX ORDER — ACETAMINOPHEN 500 MG
2 TABLET ORAL
Qty: 0 | Refills: 0 | DISCHARGE
Start: 2022-07-10

## 2022-07-10 RX ORDER — OXYCODONE HYDROCHLORIDE 5 MG/1
5 TABLET ORAL ONCE
Refills: 0 | Status: DISCONTINUED | OUTPATIENT
Start: 2022-07-10 | End: 2022-07-11

## 2022-07-10 RX ORDER — OXYTOCIN 10 UNIT/ML
333.33 VIAL (ML) INJECTION
Qty: 20 | Refills: 0 | Status: DISCONTINUED | OUTPATIENT
Start: 2022-07-10 | End: 2022-07-11

## 2022-07-10 RX ORDER — BENZOCAINE 10 %
1 GEL (GRAM) MUCOUS MEMBRANE EVERY 6 HOURS
Refills: 0 | Status: DISCONTINUED | OUTPATIENT
Start: 2022-07-10 | End: 2022-07-11

## 2022-07-10 RX ORDER — LANOLIN
1 OINTMENT (GRAM) TOPICAL EVERY 6 HOURS
Refills: 0 | Status: DISCONTINUED | OUTPATIENT
Start: 2022-07-10 | End: 2022-07-11

## 2022-07-10 RX ORDER — MAGNESIUM HYDROXIDE 400 MG/1
30 TABLET, CHEWABLE ORAL
Refills: 0 | Status: DISCONTINUED | OUTPATIENT
Start: 2022-07-10 | End: 2022-07-11

## 2022-07-10 RX ORDER — ACETAMINOPHEN 500 MG
975 TABLET ORAL
Refills: 0 | Status: DISCONTINUED | OUTPATIENT
Start: 2022-07-10 | End: 2022-07-11

## 2022-07-10 RX ORDER — SIMETHICONE 80 MG/1
80 TABLET, CHEWABLE ORAL EVERY 4 HOURS
Refills: 0 | Status: DISCONTINUED | OUTPATIENT
Start: 2022-07-10 | End: 2022-07-11

## 2022-07-10 RX ORDER — DIPHENHYDRAMINE HCL 50 MG
25 CAPSULE ORAL EVERY 6 HOURS
Refills: 0 | Status: DISCONTINUED | OUTPATIENT
Start: 2022-07-10 | End: 2022-07-11

## 2022-07-10 RX ORDER — IBUPROFEN 200 MG
600 TABLET ORAL EVERY 6 HOURS
Refills: 0 | Status: COMPLETED | OUTPATIENT
Start: 2022-07-10 | End: 2023-06-08

## 2022-07-10 RX ORDER — AER TRAVELER 0.5 G/1
1 SOLUTION RECTAL; TOPICAL EVERY 4 HOURS
Refills: 0 | Status: DISCONTINUED | OUTPATIENT
Start: 2022-07-10 | End: 2022-07-11

## 2022-07-10 RX ORDER — PRAMOXINE HYDROCHLORIDE 150 MG/15G
1 AEROSOL, FOAM RECTAL EVERY 4 HOURS
Refills: 0 | Status: DISCONTINUED | OUTPATIENT
Start: 2022-07-10 | End: 2022-07-11

## 2022-07-10 RX ORDER — IBUPROFEN 200 MG
600 TABLET ORAL EVERY 6 HOURS
Refills: 0 | Status: DISCONTINUED | OUTPATIENT
Start: 2022-07-10 | End: 2022-07-11

## 2022-07-10 RX ORDER — TETANUS TOXOID, REDUCED DIPHTHERIA TOXOID AND ACELLULAR PERTUSSIS VACCINE, ADSORBED 5; 2.5; 8; 8; 2.5 [IU]/.5ML; [IU]/.5ML; UG/.5ML; UG/.5ML; UG/.5ML
0.5 SUSPENSION INTRAMUSCULAR ONCE
Refills: 0 | Status: DISCONTINUED | OUTPATIENT
Start: 2022-07-10 | End: 2022-07-11

## 2022-07-10 RX ORDER — KETOROLAC TROMETHAMINE 30 MG/ML
30 SYRINGE (ML) INJECTION ONCE
Refills: 0 | Status: DISCONTINUED | OUTPATIENT
Start: 2022-07-10 | End: 2022-07-10

## 2022-07-10 RX ORDER — SODIUM CHLORIDE 9 MG/ML
1000 INJECTION, SOLUTION INTRAVENOUS ONCE
Refills: 0 | Status: DISCONTINUED | OUTPATIENT
Start: 2022-07-10 | End: 2022-07-11

## 2022-07-10 RX ORDER — DIBUCAINE 1 %
1 OINTMENT (GRAM) RECTAL EVERY 6 HOURS
Refills: 0 | Status: DISCONTINUED | OUTPATIENT
Start: 2022-07-10 | End: 2022-07-11

## 2022-07-10 RX ORDER — HYDROCORTISONE 1 %
1 OINTMENT (GRAM) TOPICAL EVERY 6 HOURS
Refills: 0 | Status: DISCONTINUED | OUTPATIENT
Start: 2022-07-10 | End: 2022-07-11

## 2022-07-10 RX ADMIN — Medication 30 MILLIGRAM(S): at 12:40

## 2022-07-10 RX ADMIN — Medication 1000 MILLIUNIT(S)/MIN: at 12:40

## 2022-07-10 RX ADMIN — Medication 600 MILLIGRAM(S): at 18:21

## 2022-07-10 RX ADMIN — Medication 600 MILLIGRAM(S): at 18:58

## 2022-07-10 RX ADMIN — Medication 4 MILLIUNIT(S)/MIN: at 07:48

## 2022-07-10 RX ADMIN — SODIUM CHLORIDE 125 MILLILITER(S): 9 INJECTION, SOLUTION INTRAVENOUS at 07:49

## 2022-07-10 RX ADMIN — Medication 975 MILLIGRAM(S): at 20:33

## 2022-07-10 NOTE — DISCHARGE NOTE OB - CARE PROVIDER_API CALL
Eugenia Richards)  Obstetrics and Gynecology  [address], [address]  [place] [id number]  Phone: [phone]  Fax: [phone]  Follow Up Time:

## 2022-07-10 NOTE — OB PROVIDER LABOR PROGRESS NOTE - ASSESSMENT
- Anesthesia contacted for top off   - s/p Buccal cytotec  - start pitocin if contractions space out    Xiomy Glover, PGY2  d/w Dr. Richards
a/p P0 admitted with PROM - in 2nd stage of labor  fetal status reassuring  cont toco and efm  cont epidural  pushing well
31yo  @40+6 IOL for PROM@3:30p yesterday    - SVE: 5.5/90/-2  - c/w pitocin  - FHT Cat 1, reassuring, ctm  - peanut ball    d/w Dr. Maurice Johnson, PGY2
a/p P0 admitted with PROM - still in early labor  for pit augmentation  consents signed and reviewed - all questions answered  fetal status reassuring  cont toco and efm  cont epidural - for top off now  Eugenia Richards MD

## 2022-07-10 NOTE — PROVIDER CONTACT NOTE (OTHER) - BACKGROUND
S/P  at 1044.  second degree laceration. BP and respirations WNL. Uterus firm, midline and at umbilicus. Bleeding light.

## 2022-07-10 NOTE — OB PROVIDER LABOR PROGRESS NOTE - NS_SUBJECTIVE/OBJECTIVE_OBGYN_ALL_OB_FT
VE to evaluate progress in labor
pt feeling contraction pain
Called by RN for patient who reports rectal pressure. Pit@ 6u
pt feeling intense rectal pressure

## 2022-07-10 NOTE — OB PROVIDER DELIVERY SUMMARY - NSPROVIDERDELIVERYNOTE_OBGYN_ALL_OB_FT
Bed Broken  Left anterior shoulder --> right posterior shoulder  spontaneous cry and good tone on delivery  viable baby boy  placenta delivered intact - uterus well contracted  2nd degree laceration - Betadine used  repaired in layers  uterus well contracted  cervix and rectum intact  counts correct

## 2022-07-10 NOTE — DISCHARGE NOTE OB - PATIENT PORTAL LINK FT
You can access the FollowMyHealth Patient Portal offered by [facility] by registering at the following website: [email or web address]. By joining [employer]’s FollowMyHealth portal, you will also be able to view your health information using other applications (apps) compatible with our system.

## 2022-07-10 NOTE — OB RN DELIVERY SUMMARY - NS_SEPSISRSKCALC_OBGYN_ALL_OB_FT
EOS calculated successfully. EOS Risk Factor: 0.5/1000 live births ([facility] national incidence); GA=40w6d; Temp=98.42; ROM=43.233; GBS='Negative'; Antibiotics='No antibiotics or any antibiotics < 2 hrs prior to birth'

## 2022-07-10 NOTE — OB NEONATOLOGY/PEDIATRICIAN DELIVERY SUMMARY - NSPEDSNEONOTESA_OBGYN_ALL_OB_FT
Peds called to delivery for meconium. ex-40.6wk male born via  with prolonged ROM to a 33 y/o  blood type O+ mother. Maternal history of anxiety not on medication. No significant prenatal history. PNL -/-/NR/I, GBS- on 22. SROM at 15:30 on 22 with clear fluids, mec noted at time of delivery. Baby emerged vigorous, crying, was w/d/s/s with APGARS of 9/9. Baby clinically well and kept with mother for skin-to-skin, full physical exam deferred. Mom plans to initiate breastfeeding, consents to Hep B vaccine and consents to circ. EOS 0.19. Highest maternal temp 36.7C.

## 2022-07-10 NOTE — OB PROVIDER LABOR PROGRESS NOTE - NS_OBIHICONTRACTIONPATTERNDETAILS_OBGYN_ALL_OB_FT
q2 min
q2min (pit down to 2mu)
q1-3 min
q 4-5min  adequate gyencoid pelvic - EFW leopolds 8lb 8oz  AROM forebag - thin mec

## 2022-07-10 NOTE — OB PROVIDER LABOR PROGRESS NOTE - NS_OBIHIFHRDETAILS_OBGYN_ALL_OB_FT
Cat 1:130/mod variability/ + accels/ - decels
135 mod variability + qaccels no decels
130s, moderate variability, accels, no decels
140 mod variability + q accels no decels

## 2022-07-10 NOTE — DISCHARGE NOTE OB - NS MD DC FALL RISK RISK
For information on Fall & Injury Prevention, visit: [email or web address] OR  [email or web address] OR  https://www.cdc.gov/steadi/patient.html

## 2022-07-10 NOTE — PROVIDER CONTACT NOTE (OTHER) - ASSESSMENT
Pt. denies palpitations, lightheadedness and dizziness. BP and respirations WNL. Uterus firm, midline and at umbilicus. Bleeding light.

## 2022-07-10 NOTE — DISCHARGE NOTE OB - MATERIALS PROVIDED
Vaccinations/[facility]  Screening Program/Breastfeeding Log/Breastfeeding Mother’s Support Group Information/Guide to Postpartum Care/[facility] Hearing Screen Program/Breastfeeding Guide and Packet/Birth Certificate Instructions

## 2022-07-10 NOTE — OB RN DELIVERY SUMMARY - NSSELHIDDEN_OBGYN_ALL_OB_FT
[NS_DeliveryAttending1_OBGYN_ALL_OB_FT:[id number]==],[NS_DeliveryRN_OBGYN_ALL_OB_FT:[id number]]

## 2022-07-11 VITALS
SYSTOLIC BLOOD PRESSURE: 95 MMHG | OXYGEN SATURATION: 99 % | RESPIRATION RATE: 18 BRPM | HEART RATE: 84 BPM | DIASTOLIC BLOOD PRESSURE: 66 MMHG | TEMPERATURE: 98 F

## 2022-07-11 PROCEDURE — G0463: CPT

## 2022-07-11 PROCEDURE — 86901 BLOOD TYPING SEROLOGIC RH(D): CPT

## 2022-07-11 PROCEDURE — 59025 FETAL NON-STRESS TEST: CPT

## 2022-07-11 PROCEDURE — 85025 COMPLETE CBC W/AUTO DIFF WBC: CPT

## 2022-07-11 PROCEDURE — 84112 EVAL AMNIOTIC FLUID PROTEIN: CPT

## 2022-07-11 PROCEDURE — 86900 BLOOD TYPING SEROLOGIC ABO: CPT

## 2022-07-11 PROCEDURE — 86769 SARS-COV-2 COVID-19 ANTIBODY: CPT

## 2022-07-11 PROCEDURE — 59050 FETAL MONITOR W/REPORT: CPT

## 2022-07-11 PROCEDURE — 86850 RBC ANTIBODY SCREEN: CPT

## 2022-07-11 PROCEDURE — 36415 COLL VENOUS BLD VENIPUNCTURE: CPT

## 2022-07-11 PROCEDURE — 86780 TREPONEMA PALLIDUM: CPT

## 2022-07-11 RX ADMIN — Medication 975 MILLIGRAM(S): at 09:46

## 2022-07-11 RX ADMIN — Medication 600 MILLIGRAM(S): at 00:15

## 2022-07-11 RX ADMIN — Medication 600 MILLIGRAM(S): at 13:05

## 2022-07-11 RX ADMIN — Medication 975 MILLIGRAM(S): at 16:19

## 2022-07-11 RX ADMIN — Medication 600 MILLIGRAM(S): at 12:35

## 2022-07-11 RX ADMIN — Medication 975 MILLIGRAM(S): at 15:49

## 2022-07-11 RX ADMIN — Medication 975 MILLIGRAM(S): at 02:38

## 2022-07-11 RX ADMIN — Medication 975 MILLIGRAM(S): at 10:16

## 2022-07-11 RX ADMIN — Medication 600 MILLIGRAM(S): at 06:10

## 2022-07-11 NOTE — PROGRESS NOTE ADULT - ASSESSMENT
A/P: 33yo PPD#1 s/p .  Patient is stable and doing well post-partum.   - Having some breakthrough pain, continue current pain regimen  - Increase ambulation, SCDs when not ambulating  - Continue regular diet    Kyle Hammer PGY1

## 2022-07-11 NOTE — PROGRESS NOTE ADULT - SUBJECTIVE AND OBJECTIVE BOX
OB Progress Note:  PPD#1    S: 33yo  PPD#1 s/p . Patient feels well. Pain is well controlled, has mild pain. She is tolerating a regular diet and passing flatus. She is voiding spontaneously, and ambulating without difficulty. Denies CP/SOB. Denies lightheadedness/dizziness. Denies N/V.    O:  Vitals:  Vital Signs Last 24 Hrs  T(C): 36.5 (2022 04:50), Max: 36.8 (10 Jul 2022 13:00)  T(F): 97.7 (2022 04:50), Max: 98.2 (10 Jul 2022 13:00)  HR: 77 (2022 04:50) (77 - 118)  BP: 94/59 (2022 04:50) (94/59 - 127/72)  BP(mean): --  RR: 18 (2022 04:50) (18 - 18)  SpO2: 99% (2022 04:50) (91% - 100%)    Parameters below as of 2022 04:50  Patient On (Oxygen Delivery Method): room air        MEDICATIONS  (STANDING):  acetaminophen     Tablet .. 975 milliGRAM(s) Oral <User Schedule>  dextrose 5% + lactated ringers. 1000 milliLiter(s) (125 mL/Hr) IV Continuous <Continuous>  diphtheria/tetanus/pertussis (acellular) Vaccine (ADAcel) 0.5 milliLiter(s) IntraMuscular once  ibuprofen  Tablet. 600 milliGRAM(s) Oral every 6 hours  lactated ringers Bolus 1000 milliLiter(s) IV Bolus once  oxytocin Infusion 333.333 milliUNIT(s)/Min (1000 mL/Hr) IV Continuous <Continuous>  prenatal multivitamin 1 Tablet(s) Oral daily  sodium chloride 0.9% lock flush 3 milliLiter(s) IV Push every 8 hours      Labs:  Blood type: O Positive  Rubella IgG: RPR: Negative                          13.8   12.08<H> >-----------< 180    (  @ 21:04 )             41.2                  Physical Exam:  General: NAD  Abdomen: soft, non-tender, non-distended, fundus firm  Vaginal: Lochia wnl  Extremities: No erythema/edema    
PPD#1- ATTENDING NOTE    S: Patient doing well. Minimal lochia. Pain controlled.    O: Vital Signs Last 24 Hrs  T(C): 36.5 (2022 04:50), Max: 36.8 (10 Jul 2022 13:00)  T(F): 97.7 (2022 04:50), Max: 98.2 (10 Jul 2022 13:00)  HR: 77 (2022 04:50) (77 - 118)  BP: 94/59 (2022 04:50) (94/59 - 127/72)  BP(mean): --  RR: 18 (2022 04:50) (18 - 18)  SpO2: 99% (2022 04:50) (91% - 100%)    Parameters below as of 2022 04:50  Patient On (Oxygen Delivery Method): room air        Gen: NAD  Abd: soft, NT, ND, fundus firm below umbilicus  Lochia: moderate  Ext: no tenderness, no hyper reflexia  Voiding well    Labs:                        13.8   12.08 )-----------( 180      ( 2022 21:04 )             41.2     ABO Interpretation: O ( @ 21:06)  Rh Interpretation: Positive ( @ 21:06)  ABO Interpretation: O ( @ 21:06)  Rh Interpretation: Positive ( @ 21:06)    Antibody Screen Negative    A: 32y PPD# s/p  doing well.    Plan:  Analgesia prn  Regular diet        Office [phone]  Dr. Mckenzie

## 2022-12-06 ENCOUNTER — APPOINTMENT (OUTPATIENT)
Dept: UROLOGY | Facility: CLINIC | Age: 32
End: 2022-12-06

## 2023-05-16 NOTE — OB RN DELIVERY SUMMARY - NS_ADMITROM_OBGYN_ALL_OB
Detail Level: Simple Add 40255 Cpt? (Important Note: In 2017 The Use Of 49281 Is Being Tracked By Cms To Determine Future Global Period Reimbursement For Global Periods): yes Yes

## 2023-09-20 PROBLEM — F41.9 ANXIETY DISORDER, UNSPECIFIED: Chronic | Status: ACTIVE | Noted: 2022-07-09

## 2023-09-20 PROBLEM — N30.10 INTERSTITIAL CYSTITIS (CHRONIC) WITHOUT HEMATURIA: Chronic | Status: ACTIVE | Noted: 2022-07-09

## 2023-09-22 ENCOUNTER — TRANSCRIPTION ENCOUNTER (OUTPATIENT)
Age: 33
End: 2023-09-22

## 2023-09-25 NOTE — OB RN DELIVERY SUMMARY - BABY A: WEIGHT (GM) DELIVERY
6953
I have personally provided the amount of critical care time documented below concurrently with the resident/fellow.  This time excludes time spent on separate procedures and time spent teaching. I have reviewed the resident’s / fellow’s documentation and I agree with the history, exam, and assessment and plan of care.

## 2023-09-27 ENCOUNTER — TRANSCRIPTION ENCOUNTER (OUTPATIENT)
Age: 33
End: 2023-09-27

## 2023-09-28 ENCOUNTER — APPOINTMENT (OUTPATIENT)
Dept: ANTEPARTUM | Facility: CLINIC | Age: 33
End: 2023-09-28
Payer: COMMERCIAL

## 2023-09-28 ENCOUNTER — ASOB RESULT (OUTPATIENT)
Age: 33
End: 2023-09-28

## 2023-09-28 PROCEDURE — 76805 OB US >/= 14 WKS SNGL FETUS: CPT

## 2023-10-24 ENCOUNTER — APPOINTMENT (OUTPATIENT)
Dept: ANTEPARTUM | Facility: CLINIC | Age: 33
End: 2023-10-24
Payer: COMMERCIAL

## 2023-10-24 ENCOUNTER — ASOB RESULT (OUTPATIENT)
Age: 33
End: 2023-10-24

## 2023-10-24 PROCEDURE — 76816 OB US FOLLOW-UP PER FETUS: CPT

## 2023-12-04 ENCOUNTER — TRANSCRIPTION ENCOUNTER (OUTPATIENT)
Age: 33
End: 2023-12-04

## 2023-12-18 ENCOUNTER — ASOB RESULT (OUTPATIENT)
Age: 33
End: 2023-12-18

## 2023-12-18 ENCOUNTER — APPOINTMENT (OUTPATIENT)
Dept: ANTEPARTUM | Facility: CLINIC | Age: 33
End: 2023-12-18
Payer: COMMERCIAL

## 2023-12-18 PROCEDURE — 76816 OB US FOLLOW-UP PER FETUS: CPT

## 2024-01-26 ENCOUNTER — TRANSCRIPTION ENCOUNTER (OUTPATIENT)
Age: 34
End: 2024-01-26

## 2024-02-24 ENCOUNTER — OUTPATIENT (OUTPATIENT)
Dept: OUTPATIENT SERVICES | Facility: HOSPITAL | Age: 34
LOS: 1 days | End: 2024-02-24
Payer: COMMERCIAL

## 2024-02-24 VITALS
OXYGEN SATURATION: 95 % | HEART RATE: 89 BPM | SYSTOLIC BLOOD PRESSURE: 109 MMHG | DIASTOLIC BLOOD PRESSURE: 74 MMHG | RESPIRATION RATE: 16 BRPM | TEMPERATURE: 100 F

## 2024-02-24 VITALS — SYSTOLIC BLOOD PRESSURE: 99 MMHG | HEART RATE: 83 BPM | DIASTOLIC BLOOD PRESSURE: 66 MMHG | TEMPERATURE: 98 F

## 2024-02-24 DIAGNOSIS — O26.899 OTHER SPECIFIED PREGNANCY RELATED CONDITIONS, UNSPECIFIED TRIMESTER: ICD-10-CM

## 2024-02-24 PROCEDURE — G0463: CPT

## 2024-02-24 PROCEDURE — 59025 FETAL NON-STRESS TEST: CPT | Mod: 26

## 2024-02-24 NOTE — OB PROVIDER TRIAGE NOTE - NSOBPROVIDERNOTE_OBGYN_ALL_OB_FT
A/P: 32y/o  at 38w3d presents for decreased FM now resolved. Fetus previously cephalic now breech.   - BPP , ARLEN 13.98, breech   - Patient counseled by Dr. Alexandra about potential ECV and pLTCS   - Dispo: discharge home, outpatient appointment this week     D/w Dr. Nasrin Walters, PGY-1

## 2024-02-24 NOTE — OB PROVIDER TRIAGE NOTE - ATTENDING COMMENTS
Spoke to patient. reviewed the tracing. Reactive tracing. Will send message to office for ECV to hopefully get done this week. Patient would like to try. Labor precautions given to her. Will follow up in the office at her appt on Tuesday.     Bozena Alexandra DO

## 2024-02-24 NOTE — OB PROVIDER TRIAGE NOTE - HISTORY OF PRESENT ILLNESS
R1 H&P    Pt is a 34y/o  at 38w3d presented to triage with decreased FM since this morning. Since arriving to triage, patient has felt normal fetal movement. Denies ctxs, VB and LOF.   Prenatal course: unstable lie previously breech     OBHx:   7#14 uncomplicated  GynHx: denies h/o abnormal paps, STI's, fibroids, cysts  PMHx: denies  PSHx: denies  Med: none   All: NKDA  SH: denies alcohol, tobacco, or drug use  Psych: denies h/o anxiety or depression

## 2024-02-26 DIAGNOSIS — O36.8130 DECREASED FETAL MOVEMENTS, THIRD TRIMESTER, NOT APPLICABLE OR UNSPECIFIED: ICD-10-CM

## 2024-02-26 DIAGNOSIS — O32.0XX0 MATERNAL CARE FOR UNSTABLE LIE, NOT APPLICABLE OR UNSPECIFIED: ICD-10-CM

## 2024-02-26 DIAGNOSIS — Z3A.38 38 WEEKS GESTATION OF PREGNANCY: ICD-10-CM

## 2024-02-28 ENCOUNTER — INPATIENT (INPATIENT)
Facility: HOSPITAL | Age: 34
LOS: 2 days | Discharge: HOME | End: 2024-03-02
Attending: STUDENT IN AN ORGANIZED HEALTH CARE EDUCATION/TRAINING PROGRAM | Admitting: STUDENT IN AN ORGANIZED HEALTH CARE EDUCATION/TRAINING PROGRAM
Payer: COMMERCIAL

## 2024-02-28 ENCOUNTER — APPOINTMENT (OUTPATIENT)
Dept: ANTEPARTUM | Facility: CLINIC | Age: 34
End: 2024-02-28
Payer: COMMERCIAL

## 2024-02-28 ENCOUNTER — ASOB RESULT (OUTPATIENT)
Age: 34
End: 2024-02-28

## 2024-02-28 VITALS — SYSTOLIC BLOOD PRESSURE: 110 MMHG | HEART RATE: 96 BPM | DIASTOLIC BLOOD PRESSURE: 67 MMHG

## 2024-02-28 DIAGNOSIS — O26.899 OTHER SPECIFIED PREGNANCY RELATED CONDITIONS, UNSPECIFIED TRIMESTER: ICD-10-CM

## 2024-02-28 DIAGNOSIS — Z34.80 ENCOUNTER FOR SUPERVISION OF OTHER NORMAL PREGNANCY, UNSPECIFIED TRIMESTER: ICD-10-CM

## 2024-02-28 LAB
BASOPHILS # BLD AUTO: 0.01 K/UL — SIGNIFICANT CHANGE UP (ref 0–0.2)
BASOPHILS NFR BLD AUTO: 0.1 % — SIGNIFICANT CHANGE UP (ref 0–2)
EOSINOPHIL # BLD AUTO: 0.1 K/UL — SIGNIFICANT CHANGE UP (ref 0–0.5)
EOSINOPHIL NFR BLD AUTO: 1.1 % — SIGNIFICANT CHANGE UP (ref 0–6)
HCT VFR BLD CALC: 36 % — SIGNIFICANT CHANGE UP (ref 34.5–45)
HGB BLD-MCNC: 12 G/DL — SIGNIFICANT CHANGE UP (ref 11.5–15.5)
IMM GRANULOCYTES NFR BLD AUTO: 1.6 % — HIGH (ref 0–0.9)
LYMPHOCYTES # BLD AUTO: 1.2 K/UL — SIGNIFICANT CHANGE UP (ref 1–3.3)
LYMPHOCYTES # BLD AUTO: 13.4 % — SIGNIFICANT CHANGE UP (ref 13–44)
MCHC RBC-ENTMCNC: 30.8 PG — SIGNIFICANT CHANGE UP (ref 27–34)
MCHC RBC-ENTMCNC: 33.3 GM/DL — SIGNIFICANT CHANGE UP (ref 32–36)
MCV RBC AUTO: 92.5 FL — SIGNIFICANT CHANGE UP (ref 80–100)
MONOCYTES # BLD AUTO: 0.73 K/UL — SIGNIFICANT CHANGE UP (ref 0–0.9)
MONOCYTES NFR BLD AUTO: 8.2 % — SIGNIFICANT CHANGE UP (ref 2–14)
NEUTROPHILS # BLD AUTO: 6.75 K/UL — SIGNIFICANT CHANGE UP (ref 1.8–7.4)
NEUTROPHILS NFR BLD AUTO: 75.6 % — SIGNIFICANT CHANGE UP (ref 43–77)
NRBC # BLD: 0 /100 WBCS — SIGNIFICANT CHANGE UP (ref 0–0)
PLATELET # BLD AUTO: 175 K/UL — SIGNIFICANT CHANGE UP (ref 150–400)
RBC # BLD: 3.89 M/UL — SIGNIFICANT CHANGE UP (ref 3.8–5.2)
RBC # FLD: 12.6 % — SIGNIFICANT CHANGE UP (ref 10.3–14.5)
WBC # BLD: 8.93 K/UL — SIGNIFICANT CHANGE UP (ref 3.8–10.5)
WBC # FLD AUTO: 8.93 K/UL — SIGNIFICANT CHANGE UP (ref 3.8–10.5)

## 2024-02-28 PROCEDURE — 76819 FETAL BIOPHYS PROFIL W/O NST: CPT

## 2024-02-28 PROCEDURE — 76816 OB US FOLLOW-UP PER FETUS: CPT

## 2024-02-28 RX ORDER — AMPICILLIN TRIHYDRATE 250 MG
2 CAPSULE ORAL ONCE
Refills: 0 | Status: COMPLETED | OUTPATIENT
Start: 2024-02-28 | End: 2024-02-28

## 2024-02-28 RX ORDER — CITRIC ACID/SODIUM CITRATE 300-500 MG
15 SOLUTION, ORAL ORAL EVERY 6 HOURS
Refills: 0 | Status: DISCONTINUED | OUTPATIENT
Start: 2024-02-28 | End: 2024-02-29

## 2024-02-28 RX ORDER — SODIUM CHLORIDE 9 MG/ML
1000 INJECTION, SOLUTION INTRAVENOUS
Refills: 0 | Status: DISCONTINUED | OUTPATIENT
Start: 2024-02-28 | End: 2024-03-02

## 2024-02-28 RX ORDER — SODIUM CHLORIDE 9 MG/ML
1000 INJECTION, SOLUTION INTRAVENOUS ONCE
Refills: 0 | Status: COMPLETED | OUTPATIENT
Start: 2024-02-28 | End: 2024-02-28

## 2024-02-28 RX ORDER — CHLORHEXIDINE GLUCONATE 213 G/1000ML
1 SOLUTION TOPICAL DAILY
Refills: 0 | Status: DISCONTINUED | OUTPATIENT
Start: 2024-02-28 | End: 2024-02-29

## 2024-02-28 RX ORDER — OXYTOCIN 10 UNIT/ML
333.33 VIAL (ML) INJECTION
Qty: 20 | Refills: 0 | Status: DISCONTINUED | OUTPATIENT
Start: 2024-02-28 | End: 2024-02-29

## 2024-02-28 RX ORDER — OXYTOCIN 10 UNIT/ML
4 VIAL (ML) INJECTION
Qty: 30 | Refills: 0 | Status: DISCONTINUED | OUTPATIENT
Start: 2024-02-28 | End: 2024-02-28

## 2024-02-28 RX ORDER — OXYTOCIN 10 UNIT/ML
2 VIAL (ML) INJECTION
Qty: 30 | Refills: 0 | Status: DISCONTINUED | OUTPATIENT
Start: 2024-02-28 | End: 2024-02-29

## 2024-02-28 RX ORDER — AMPICILLIN TRIHYDRATE 250 MG
1 CAPSULE ORAL EVERY 4 HOURS
Refills: 0 | Status: DISCONTINUED | OUTPATIENT
Start: 2024-02-28 | End: 2024-02-29

## 2024-02-28 RX ORDER — SODIUM CHLORIDE 9 MG/ML
1000 INJECTION, SOLUTION INTRAVENOUS
Refills: 0 | Status: DISCONTINUED | OUTPATIENT
Start: 2024-02-28 | End: 2024-02-29

## 2024-02-28 RX ADMIN — Medication 2 MILLIUNIT(S)/MIN: at 16:50

## 2024-02-28 RX ADMIN — Medication 200 GRAM(S): at 16:20

## 2024-02-28 RX ADMIN — Medication 108 GRAM(S): at 20:09

## 2024-02-28 RX ADMIN — SODIUM CHLORIDE 1000 MILLILITER(S): 9 INJECTION, SOLUTION INTRAVENOUS at 21:50

## 2024-02-28 NOTE — OB PROVIDER H&P - HISTORY OF PRESENT ILLNESS
R1 H&P    Pt is a 34y/o  at 39w presents for IOL for unstable lie and polyhydramnios (ARLEN:24). Endorses good FM. Denies ctxs, VB and LOF.   Prenatal course: unstable lie previously breech   EFW: 3631g  GBS neg     OBHx:   7#14 uncomplicated  GynHx: denies h/o abnormal paps, STI's, fibroids, cysts  PMHx: denies  PSHx: denies  Med: none   All: NKDA  SH: denies alcohol, tobacco, or drug use  Psych: denies h/o anxiety or depression   R1 H&P    Pt is a 32y/o  at 39w presents for IOL for unstable lie and polyhydramnios (ARLEN:24). Endorses good FM. Denies ctxs, VB and LOF.   Prenatal course: unstable lie previously breech   EFW: 3631g  GBS pos    OBHx:   7#14 uncomplicated  GynHx: denies h/o abnormal paps, STI's, fibroids, cysts  PMHx: denies  PSHx: denies  Med: none   All: NKDA  SH: denies alcohol, tobacco, or drug use  Psych: denies h/o anxiety or depression

## 2024-02-28 NOTE — OB PROVIDER H&P - NSLOWPPHRISK_OBGYN_A_OB
No previous uterine incision/Roman Pregnancy/Less than or equal to 4 previous vaginal births/No known bleeding disorder/No history of postpartum hemorrhage

## 2024-02-28 NOTE — OB PROVIDER H&P - ASSESSMENT
A&P: Pt is a 34y/o  at 39w presents for IOL for unstable lie and polyhydramnios (ARLEN:24).   Labor: admit to L&D  -routine labs  -EFM/toco  -NPO, IV hydration  Fetal: cat 1 tracing, fetal status reassuring  GBS: neg  Analgesia:       Discussed with Dr. Missy Walters PGY-1 A&P: Pt is a 32y/o  at 39w presents for IOL for unstable lie and polyhydramnios (ARLEN:24).   Labor: admit to L&D  -pitocin and AROM   -routine labs  -EFM/toco  -NPO, IV hydration  Fetal: cat 1 tracing, fetal status reassuring  GBS: neg  Analgesia: epidural PRN       Discussed with Dr. Missy Walters PGY-1 A&P: Pt is a 32y/o  at 39w presents for IOL for unstable lie and polyhydramnios (ARLEN:24).   Labor: admit to L&D  -pitocin and AROM   -routine labs  -EFM/toco  -NPO, IV hydration  Fetal: cat 1 tracing, fetal status reassuring  GBS: pos  Analgesia: epidural PRN       Discussed with Dr. Missy Walters PGY-1

## 2024-02-28 NOTE — OB PROVIDER H&P - NSLASTDATEVISIT_OBGYN_ALL_OB
Unknown Intermediate Repair Preamble Text (Leave Blank If You Do Not Want): Undermining was performed with blunt dissection.

## 2024-02-29 ENCOUNTER — TRANSCRIPTION ENCOUNTER (OUTPATIENT)
Age: 34
End: 2024-02-29

## 2024-02-29 LAB — T PALLIDUM AB TITR SER: NEGATIVE — SIGNIFICANT CHANGE UP

## 2024-02-29 PROCEDURE — 88307 TISSUE EXAM BY PATHOLOGIST: CPT | Mod: 26

## 2024-02-29 RX ORDER — OXYCODONE HYDROCHLORIDE 5 MG/1
5 TABLET ORAL ONCE
Refills: 0 | Status: DISCONTINUED | OUTPATIENT
Start: 2024-02-29 | End: 2024-03-02

## 2024-02-29 RX ORDER — DIPHENHYDRAMINE HCL 50 MG
25 CAPSULE ORAL EVERY 6 HOURS
Refills: 0 | Status: DISCONTINUED | OUTPATIENT
Start: 2024-02-29 | End: 2024-03-02

## 2024-02-29 RX ORDER — BENZOCAINE 10 %
1 GEL (GRAM) MUCOUS MEMBRANE EVERY 6 HOURS
Refills: 0 | Status: DISCONTINUED | OUTPATIENT
Start: 2024-02-29 | End: 2024-03-02

## 2024-02-29 RX ORDER — OXYTOCIN 10 UNIT/ML
41.67 VIAL (ML) INJECTION
Qty: 20 | Refills: 0 | Status: DISCONTINUED | OUTPATIENT
Start: 2024-02-29 | End: 2024-03-02

## 2024-02-29 RX ORDER — MAGNESIUM HYDROXIDE 400 MG/1
30 TABLET, CHEWABLE ORAL
Refills: 0 | Status: DISCONTINUED | OUTPATIENT
Start: 2024-02-29 | End: 2024-03-02

## 2024-02-29 RX ORDER — HYDROCORTISONE 1 %
1 OINTMENT (GRAM) TOPICAL EVERY 6 HOURS
Refills: 0 | Status: DISCONTINUED | OUTPATIENT
Start: 2024-02-29 | End: 2024-03-02

## 2024-02-29 RX ORDER — LANOLIN
1 OINTMENT (GRAM) TOPICAL EVERY 6 HOURS
Refills: 0 | Status: DISCONTINUED | OUTPATIENT
Start: 2024-02-29 | End: 2024-03-02

## 2024-02-29 RX ORDER — ACETAMINOPHEN 500 MG
975 TABLET ORAL
Refills: 0 | Status: DISCONTINUED | OUTPATIENT
Start: 2024-02-29 | End: 2024-03-02

## 2024-02-29 RX ORDER — SODIUM CHLORIDE 9 MG/ML
500 INJECTION, SOLUTION INTRAVENOUS ONCE
Refills: 0 | Status: DISCONTINUED | OUTPATIENT
Start: 2024-02-29 | End: 2024-02-29

## 2024-02-29 RX ORDER — PIPERACILLIN AND TAZOBACTAM 4; .5 G/20ML; G/20ML
4.5 INJECTION, POWDER, LYOPHILIZED, FOR SOLUTION INTRAVENOUS ONCE
Refills: 0 | Status: COMPLETED | OUTPATIENT
Start: 2024-02-29 | End: 2024-02-29

## 2024-02-29 RX ORDER — SODIUM CHLORIDE 9 MG/ML
1000 INJECTION, SOLUTION INTRAVENOUS ONCE
Refills: 0 | Status: DISCONTINUED | OUTPATIENT
Start: 2024-02-29 | End: 2024-02-29

## 2024-02-29 RX ORDER — PIPERACILLIN AND TAZOBACTAM 4; .5 G/20ML; G/20ML
4.5 INJECTION, POWDER, LYOPHILIZED, FOR SOLUTION INTRAVENOUS ONCE
Refills: 0 | Status: DISCONTINUED | OUTPATIENT
Start: 2024-02-29 | End: 2024-02-29

## 2024-02-29 RX ORDER — AER TRAVELER 0.5 G/1
1 SOLUTION RECTAL; TOPICAL EVERY 4 HOURS
Refills: 0 | Status: DISCONTINUED | OUTPATIENT
Start: 2024-02-29 | End: 2024-03-02

## 2024-02-29 RX ORDER — IBUPROFEN 200 MG
600 TABLET ORAL EVERY 6 HOURS
Refills: 0 | Status: DISCONTINUED | OUTPATIENT
Start: 2024-02-29 | End: 2024-03-02

## 2024-02-29 RX ORDER — KETOROLAC TROMETHAMINE 30 MG/ML
30 SYRINGE (ML) INJECTION ONCE
Refills: 0 | Status: DISCONTINUED | OUTPATIENT
Start: 2024-02-29 | End: 2024-02-29

## 2024-02-29 RX ORDER — PRAMOXINE HYDROCHLORIDE 150 MG/15G
1 AEROSOL, FOAM RECTAL EVERY 4 HOURS
Refills: 0 | Status: DISCONTINUED | OUTPATIENT
Start: 2024-02-29 | End: 2024-03-02

## 2024-02-29 RX ORDER — IBUPROFEN 200 MG
600 TABLET ORAL EVERY 6 HOURS
Refills: 0 | Status: COMPLETED | OUTPATIENT
Start: 2024-02-29 | End: 2025-01-27

## 2024-02-29 RX ORDER — OXYCODONE HYDROCHLORIDE 5 MG/1
5 TABLET ORAL
Refills: 0 | Status: DISCONTINUED | OUTPATIENT
Start: 2024-02-29 | End: 2024-03-02

## 2024-02-29 RX ORDER — SIMETHICONE 80 MG/1
80 TABLET, CHEWABLE ORAL EVERY 4 HOURS
Refills: 0 | Status: DISCONTINUED | OUTPATIENT
Start: 2024-02-29 | End: 2024-03-02

## 2024-02-29 RX ORDER — SODIUM CHLORIDE 9 MG/ML
3 INJECTION INTRAMUSCULAR; INTRAVENOUS; SUBCUTANEOUS EVERY 8 HOURS
Refills: 0 | Status: DISCONTINUED | OUTPATIENT
Start: 2024-02-29 | End: 2024-03-02

## 2024-02-29 RX ORDER — ACETAMINOPHEN 500 MG
1000 TABLET ORAL ONCE
Refills: 0 | Status: COMPLETED | OUTPATIENT
Start: 2024-02-29 | End: 2024-02-29

## 2024-02-29 RX ORDER — TETANUS TOXOID, REDUCED DIPHTHERIA TOXOID AND ACELLULAR PERTUSSIS VACCINE, ADSORBED 5; 2.5; 8; 8; 2.5 [IU]/.5ML; [IU]/.5ML; UG/.5ML; UG/.5ML; UG/.5ML
0.5 SUSPENSION INTRAMUSCULAR ONCE
Refills: 0 | Status: DISCONTINUED | OUTPATIENT
Start: 2024-02-29 | End: 2024-03-02

## 2024-02-29 RX ORDER — DIBUCAINE 1 %
1 OINTMENT (GRAM) RECTAL EVERY 6 HOURS
Refills: 0 | Status: DISCONTINUED | OUTPATIENT
Start: 2024-02-29 | End: 2024-03-02

## 2024-02-29 RX ORDER — PIPERACILLIN AND TAZOBACTAM 4; .5 G/20ML; G/20ML
4.5 INJECTION, POWDER, LYOPHILIZED, FOR SOLUTION INTRAVENOUS EVERY 8 HOURS
Refills: 0 | Status: DISCONTINUED | OUTPATIENT
Start: 2024-02-29 | End: 2024-03-01

## 2024-02-29 RX ADMIN — PIPERACILLIN AND TAZOBACTAM 200 GRAM(S): 4; .5 INJECTION, POWDER, LYOPHILIZED, FOR SOLUTION INTRAVENOUS at 23:42

## 2024-02-29 RX ADMIN — Medication 600 MILLIGRAM(S): at 23:42

## 2024-02-29 RX ADMIN — Medication 600 MILLIGRAM(S): at 17:52

## 2024-02-29 RX ADMIN — Medication 400 MILLIGRAM(S): at 07:25

## 2024-02-29 RX ADMIN — Medication 108 GRAM(S): at 00:23

## 2024-02-29 RX ADMIN — Medication 108 GRAM(S): at 08:33

## 2024-02-29 RX ADMIN — PIPERACILLIN AND TAZOBACTAM 200 GRAM(S): 4; .5 INJECTION, POWDER, LYOPHILIZED, FOR SOLUTION INTRAVENOUS at 15:39

## 2024-02-29 RX ADMIN — Medication 975 MILLIGRAM(S): at 15:05

## 2024-02-29 RX ADMIN — Medication 975 MILLIGRAM(S): at 22:16

## 2024-02-29 RX ADMIN — Medication 600 MILLIGRAM(S): at 18:21

## 2024-02-29 RX ADMIN — Medication 108 GRAM(S): at 04:35

## 2024-02-29 RX ADMIN — SODIUM CHLORIDE 3 MILLILITER(S): 9 INJECTION INTRAMUSCULAR; INTRAVENOUS; SUBCUTANEOUS at 21:14

## 2024-02-29 RX ADMIN — PIPERACILLIN AND TAZOBACTAM 200 GRAM(S): 4; .5 INJECTION, POWDER, LYOPHILIZED, FOR SOLUTION INTRAVENOUS at 07:23

## 2024-02-29 RX ADMIN — Medication 975 MILLIGRAM(S): at 15:35

## 2024-02-29 RX ADMIN — Medication 975 MILLIGRAM(S): at 21:16

## 2024-02-29 RX ADMIN — Medication 30 MILLIGRAM(S): at 10:45

## 2024-02-29 NOTE — DISCHARGE NOTE OB - PLAN OF CARE
routine post partum activities;  regular diet;  limited physical and sexual activities for 5-6;  to office in 5-6 weeks

## 2024-02-29 NOTE — DISCHARGE NOTE OB - MEDICATION SUMMARY - MEDICATIONS TO TAKE
I will START or STAY ON the medications listed below when I get home from the hospital:    ibuprofen 600 mg oral tablet  -- 1 tab(s) by mouth every 6 hours  -- Indication: For Normal vaginal delivery    acetaminophen 325 mg oral tablet  -- 2 tab(s) by mouth every 6 hours  -- Indication: For Normal vaginal delivery    Prenatal Multivitamins oral tablet  -- 1 tab(s) by mouth once a day  -- Indication: For Normal vaginal delivery

## 2024-02-29 NOTE — CHART NOTE - NSCHARTNOTEFT_GEN_A_CORE
Patient evaluated for temperature of 38.6 with signs of fetal tachycardia, concerning for chorio. Patient started on zosyn and IV tylenol ordered. Will continue to monitor VS in the setting of chorioamnionitis.  Brenda PGY2

## 2024-02-29 NOTE — DISCHARGE NOTE OB - PATIENT PORTAL LINK FT
You can access the FollowMyHealth Patient Portal offered by [facility] by registering at the following website: [email or web address]. By joining [employer]’s FollowMyHealth portal, you will also be able to view your health information using other applications (apps) compatible with our system.

## 2024-02-29 NOTE — OB PROVIDER LABOR PROGRESS NOTE - ASSESSMENT
A/P: 33yoF P1 @39.1 weeks gestation admitted for IOL 2/2 poly.   - Continue IOL with Pitocin    - EFM/Cypress; continuous monitoring   - Ampicillin for GBS ppx   - Resuscitative measures prn   - Anticipate    - D/w Dr. Jean Godinez, PA[place] 
Will continue pitocin. Continue peanut birthing ball. Reviewed labor progress with pt and partner and that not in active labor yet. Reviewed do not want frequent exams given PROM this early, will space out exams while in latent phase. With sooner exams prn.     Latrice Pena, DO
Reviewed with pt she has made good progress given starting exam. Reassured. Pt was in high fowlers. Placed on R with peanut. Anticipate delivery    Latrice Pena, DO
UNDER US GUIDANCE WITH FUNDAL PRESSURE AROM DONE WITH P.A. & MYSELF. CLEAR FLUID. VTX WELL APPLIED. DOES NOT HAVE POLY. GBS IS HANGING. WILL START PITOCIN 2X2 MU.     JANUSZ AARON  
WOULD LIKE EPI. WILL CALL ANESTHESIA    J GALEN

## 2024-02-29 NOTE — OB PROVIDER LABOR PROGRESS NOTE - NS_SUBJECTIVE/OBJECTIVE_OBGYN_ALL_OB_FT
Pt comfortable in bed. With low back pain. Tearful because upset she has not delivered yet. Pitocin at 18mU
PT SENT BY [facility] AS PT IS AT TERM & HAS UNSTABLE LIE. I SAW PT YESTERDAY & FETUS WAS BREECH. WENT TO [facility] FOR ECV CONSULT. WAS FOUND TO BE VTX. SENT OVER FOR IOL  GBS+. EFW=8-7 2/27. .
Pt seen and examined at bedside for cervical change.   VE: 6.5/80/-2  Pt s/p top off for epidural   Pitocin @18mU    Vital Signs Last 24 Hrs  T(C): 36.8 (28 Feb 2024 23:42), Max: 37.1 (28 Feb 2024 21:24)  T(F): 98.24 (28 Feb 2024 23:42), Max: 98.78 (28 Feb 2024 21:24)  HR: 114 (29 Feb 2024 04:47) (75 - 116)  BP: 107/67 (29 Feb 2024 04:40) (86/54 - 152/114)  RR: 18 (28 Feb 2024 23:42) (17 - 18)  SpO2: 97% (29 Feb 2024 04:47) (88% - 100%)
PT STARTING TO GET UNCOMFORTABLE
Pt comfortable in bed. On L with peanut birthing ball. Pitocin just increased to 18mU

## 2024-02-29 NOTE — OB PROVIDER DELIVERY SUMMARY - NSPROVIDERDELIVERYNOTE_OBGYN_ALL_OB_FT
Patient progressed well to fd and had a short second stage.   was performed with epidural anesthesia over small midline episiotomy.  viable female infant was delivered jon position.  no nuchal cord.  delayed cord clamping was done.  cord blood and cord gases to lab.  Placenta delivered spontaneously and uterine exploration was wnl.  cervix and vagina intact.  episiotomy was repaired in the usual manner with 00/000 rapide.  all counts were correct after delivery.  patient tolerated all procedures well.

## 2024-02-29 NOTE — OB RN DELIVERY SUMMARY - NSSELHIDDEN_OBGYN_ALL_OB_FT
[NS_DeliveryAttending1_OBGYN_ALL_OB_FT:[id number]],[NS_DeliveryRN_OBGYN_ALL_OB_FT:[id number]]

## 2024-02-29 NOTE — DISCHARGE NOTE OB - HOSPITAL COURSE
the patient was admtted for induction of labor with unstable lie.  vertex on admission.  she did well and had a spontaneous vaginal delivery.  she had a temp in labor and was placed on ab.   she had a good recovery.  the patient was admtted for induction of labor with unstable lie.  vertex on admission.  she did well and had a spontaneous vaginal delivery.  she had a temp in labor and was placed on ab.   she had a good recovery. she was stable for dc on PPD2

## 2024-02-29 NOTE — DISCHARGE NOTE OB - CARE PLAN
1 Principal Discharge DX:	Normal vaginal delivery  Assessment and plan of treatment:	routine post partum activities;  regular diet;  limited physical and sexual activities for 5-6;  to office in 5-6 weeks

## 2024-02-29 NOTE — DISCHARGE NOTE OB - CARE PROVIDER_API CALL
Jv Mckenzie  Obstetrics and Gynecology  [address], [address]  [place] [address]  Phone: [phone]  Fax: [phone]  Follow Up Time:

## 2024-02-29 NOTE — OB RN DELIVERY SUMMARY - NS_SEPSISRSKCALC_OBGYN_ALL_OB_FT
EOS calculated successfully. EOS Risk Factor: 0.5/1000 live births ([facility] national incidence); GA=39w1d; Temp=101.48; ROM=16.683; GBS='Positive'; Antibiotics='GBS specific antibiotics > 2 hrs prior to birth'

## 2024-03-01 ENCOUNTER — TRANSCRIPTION ENCOUNTER (OUTPATIENT)
Age: 34
End: 2024-03-01

## 2024-03-01 PROCEDURE — 93970 EXTREMITY STUDY: CPT | Mod: 26

## 2024-03-01 RX ADMIN — Medication 1 TABLET(S): at 15:48

## 2024-03-01 RX ADMIN — Medication 975 MILLIGRAM(S): at 04:00

## 2024-03-01 RX ADMIN — Medication 600 MILLIGRAM(S): at 18:41

## 2024-03-01 RX ADMIN — SODIUM CHLORIDE 3 MILLILITER(S): 9 INJECTION INTRAMUSCULAR; INTRAVENOUS; SUBCUTANEOUS at 06:07

## 2024-03-01 RX ADMIN — Medication 600 MILLIGRAM(S): at 12:14

## 2024-03-01 RX ADMIN — Medication 975 MILLIGRAM(S): at 16:30

## 2024-03-01 RX ADMIN — Medication 975 MILLIGRAM(S): at 10:21

## 2024-03-01 RX ADMIN — Medication 600 MILLIGRAM(S): at 00:42

## 2024-03-01 RX ADMIN — PIPERACILLIN AND TAZOBACTAM 200 GRAM(S): 4; .5 INJECTION, POWDER, LYOPHILIZED, FOR SOLUTION INTRAVENOUS at 08:00

## 2024-03-01 RX ADMIN — Medication 975 MILLIGRAM(S): at 21:18

## 2024-03-01 RX ADMIN — Medication 975 MILLIGRAM(S): at 11:00

## 2024-03-01 RX ADMIN — Medication 975 MILLIGRAM(S): at 22:18

## 2024-03-01 RX ADMIN — Medication 600 MILLIGRAM(S): at 06:09

## 2024-03-01 RX ADMIN — Medication 975 MILLIGRAM(S): at 03:00

## 2024-03-01 RX ADMIN — Medication 975 MILLIGRAM(S): at 15:48

## 2024-03-01 RX ADMIN — Medication 600 MILLIGRAM(S): at 07:09

## 2024-03-01 RX ADMIN — Medication 600 MILLIGRAM(S): at 13:00

## 2024-03-01 NOTE — PROGRESS NOTE ADULT - NS ATTEND AMEND GEN_ALL_CORE FT
34 yo PPD#1 s/p . Patient doing well and meeting all postpartum milestones. Pt c/o right leg pain near her knee. Negative Brayan's sign. Will f/u LE dopplers. If LE dopplers neg, pt can be discharged home today with routine outpatient follow up.

## 2024-03-02 VITALS
SYSTOLIC BLOOD PRESSURE: 96 MMHG | HEART RATE: 82 BPM | RESPIRATION RATE: 18 BRPM | DIASTOLIC BLOOD PRESSURE: 65 MMHG | OXYGEN SATURATION: 97 % | TEMPERATURE: 98 F

## 2024-03-02 PROCEDURE — 88307 TISSUE EXAM BY PATHOLOGIST: CPT

## 2024-03-02 PROCEDURE — 85025 COMPLETE CBC W/AUTO DIFF WBC: CPT

## 2024-03-02 PROCEDURE — 86780 TREPONEMA PALLIDUM: CPT

## 2024-03-02 PROCEDURE — 86900 BLOOD TYPING SEROLOGIC ABO: CPT

## 2024-03-02 PROCEDURE — 36415 COLL VENOUS BLD VENIPUNCTURE: CPT

## 2024-03-02 PROCEDURE — 93970 EXTREMITY STUDY: CPT

## 2024-03-02 PROCEDURE — 86850 RBC ANTIBODY SCREEN: CPT

## 2024-03-02 PROCEDURE — 59050 FETAL MONITOR W/REPORT: CPT

## 2024-03-02 PROCEDURE — 86901 BLOOD TYPING SEROLOGIC RH(D): CPT

## 2024-03-02 RX ADMIN — Medication 600 MILLIGRAM(S): at 12:27

## 2024-03-02 RX ADMIN — Medication 975 MILLIGRAM(S): at 09:50

## 2024-03-02 RX ADMIN — Medication 600 MILLIGRAM(S): at 01:19

## 2024-03-02 RX ADMIN — Medication 600 MILLIGRAM(S): at 00:19

## 2024-03-02 RX ADMIN — Medication 975 MILLIGRAM(S): at 02:48

## 2024-03-02 RX ADMIN — Medication 975 MILLIGRAM(S): at 03:48

## 2024-03-02 RX ADMIN — Medication 975 MILLIGRAM(S): at 09:04

## 2024-03-02 RX ADMIN — Medication 600 MILLIGRAM(S): at 05:52

## 2024-03-02 RX ADMIN — Medication 1 TABLET(S): at 12:27

## 2024-03-02 NOTE — PROGRESS NOTE ADULT - ATTENDING COMMENTS
PPD2 s/p , doing well   -Discharge home   -Precautions given   -Follow-up in office in 6 weeks      Latrice Pena,

## 2024-03-02 NOTE — PROGRESS NOTE ADULT - SUBJECTIVE AND OBJECTIVE BOX
Postpartum Note- PPD#1    Prenatal Labs  Blood type: O Positive  Rubella IgG: imm  RPR: Negative        S:Patient w/o complaints, pain is controlled.  Pt reports an episode of right posterior leg pain overnight which has resolved.   Pt is OOB, tolerating PO, voiding. Vaginal bleeding mild to moderate. Patient is breastfeeding. Denies dizziness, CP, SOB, n/v, abdominal pain or calf pain.    pmhx/surg:     O:  Vital Signs Last 24 Hrs  T(C): 36.4 (01 Mar 2024 05:09), Max: 37.1 (29 Feb 2024 08:23)  T(F): 97.6 (01 Mar 2024 05:09), Max: 98.78 (29 Feb 2024 08:23)  HR: 78 (01 Mar 2024 05:09) (73 - 113)  BP: 98/66 (01 Mar 2024 05:09) (89/50 - 132/71)  BP(mean): 78 (29 Feb 2024 12:45) (74 - 80)  RR: 18 (01 Mar 2024 05:09) (16 - 18)  SpO2: 99% (01 Mar 2024 05:09) (86% - 99%)    Parameters below as of 01 Mar 2024 05:09  Patient On (Oxygen Delivery Method): room air         Gen: NAD  Abdomen: Soft, nontender, non-distended, fundus firm.  Vaginal: Lochia WNL.   Ext: Neg edema, Neg calf tenderness    LABS:    Hemoglobin: 12.0 g/dL (02-28 @ 16:52)      Hematocrit: 36.0 % (02-28 @ 16:52)                
Postpartum Note- PPD# 2    Allergies    No Known Allergies    Intolerances            S:Patient is a  33y G 2   P 2           PPD# 2         S/P       Patient w/o complaints, pain is controlled.  Pregnancy c/w chorioamnitis s/p Zosyn.  The patient denies right knee pain.   Pt is OOB, tolerating PO, passing flatus. Lochia WNL.     O:  Vital Signs Last 24 Hrs  T(C): 36.7 (02 Mar 2024 06:13), Max: 36.8 (01 Mar 2024 17:00)  T(F): 98.1 (02 Mar 2024 06:13), Max: 98.2 (01 Mar 2024 17:00)  HR: 82 (02 Mar 2024 06:13) (81 - 84)  BP: 96/65 (02 Mar 2024 06:13) (95/65 - 112/78)  BP(mean): --  RR: 18 (02 Mar 2024 06:13) (18 - 18)  SpO2: 97% (02 Mar 2024 06:13) (97% - 98%)    Parameters below as of 02 Mar 2024 06:13  Patient On (Oxygen Delivery Method): room air         Gen: NAD  Abdomen: Soft, nontender, non-distended, fundus firm.  Lochia WNL  Ext: Neg edema, Neg calf tenderness    LABS:               A/P:  33y  PPD # 2  S/P  , doing well    PMHx:   7#14 uncomplicated  Current Issues: Right knee pain, Dopplers negative for a DVT  Chorioamnitis( Tmax 38.3 3/@1030p), s/p Zosyn, VSS    Increase OOB  Regular diet  PO Pain protocol  Routine Postpartum Care  Discharge Planning

## 2024-03-02 NOTE — PROGRESS NOTE ADULT - PROBLEM SELECTOR PLAN 1
Increase OOB.  Regular diet.  PO Pain protocol.  VA Duplex ordered - low suspicion for DVT but give location of pain and postpartum state, will perform.   Routine Postpartum Care.
Increase OOB  Regular diet  PO Pain protocol  Routine Postpartum Care  Discharge Planning

## 2024-03-10 PROBLEM — N30.10 INTERSTITIAL CYSTITIS: Status: ACTIVE | Noted: 2021-10-13

## 2024-03-10 LAB — SURGICAL PATHOLOGY STUDY: SIGNIFICANT CHANGE UP

## 2024-03-29 ENCOUNTER — TRANSCRIPTION ENCOUNTER (OUTPATIENT)
Age: 34
End: 2024-03-29

## 2024-04-16 NOTE — OB RN TRIAGE NOTE - NS_CONTACTNUMBEROFSUPPORTPERSON_OBGYN_ALL_OB_FT
What Type Of Note Output Would You Prefer (Optional)?: Bullet Format Hpi Title: Evaluation of Skin Lesions How Severe Are Your Spot(S)?: mild Have Your Spot(S) Been Treated In The Past?: has not been treated [phone]

## 2024-04-26 ENCOUNTER — TRANSCRIPTION ENCOUNTER (OUTPATIENT)
Age: 34
End: 2024-04-26

## 2024-05-24 ENCOUNTER — TRANSCRIPTION ENCOUNTER (OUTPATIENT)
Age: 34
End: 2024-05-24

## 2024-06-07 ENCOUNTER — TRANSCRIPTION ENCOUNTER (OUTPATIENT)
Age: 34
End: 2024-06-07

## 2024-11-01 NOTE — DISCHARGE NOTE OB - MEDICATION SUMMARY - MEDICATIONS TO TAKE
Patient's Advance Directives have been scanned into their medical record.      I will START or STAY ON the medications listed below when I get home from the hospital:    acetaminophen 325 mg oral tablet  -- 2 tab(s) by mouth every 6 hours  -- Indication: For     ibuprofen 600 mg oral tablet  -- 1 tab(s) by mouth every 6 hours  -- Indication: For     Prenatal Multivitamins oral tablet  -- 1 tab(s) by mouth once a day  -- Indication: For

## 2024-11-20 ENCOUNTER — NON-APPOINTMENT (OUTPATIENT)
Age: 34
End: 2024-11-20

## 2024-12-31 NOTE — OB RN PATIENT PROFILE - NS_ISOLATION_OBGYN_ALL_OB
PCP: Maria Eugenia Leblanc MD    Last appt: 8/7/2024     Future Appointments   Date Time Provider Department Center   2/19/2025  8:40 AM [facility]       Requested Prescriptions     Pending Prescriptions Disp Refills    losartan (COZAAR) 50 MG tablet [Pharmacy Med Name: LOSARTAN POTASSIUM 50 MG TAB] 90 tablet 1     Sig: TAKE 1 TABLET BY MOUTH EVERY DAY. TAKE WITH HCTZ 12.5 MG TABLET       Prior labs and Blood pressures:  BP Readings from Last 3 Encounters:   08/07/24 136/79   06/03/24 122/60   05/21/24 129/66     Lab Results   Component Value Date/Time     08/07/2024 09:11 AM    K 3.8 08/07/2024 09:11 AM     08/07/2024 09:11 AM    CO2 33 08/07/2024 09:11 AM    BUN 12 08/07/2024 09:11 AM    GFRAA >60 06/02/2022 10:50 AM     No results found for: \"HBA1C\", \"HCU6DSYT\"  Lab Results   Component Value Date/Time    CHOL 134 08/07/2024 09:11 AM    HDL 64 08/07/2024 09:11 AM    LDL 42.6 08/07/2024 09:11 AM    LDL 40.6 02/07/2024 10:49 AM    VLDL 27.4 08/07/2024 09:11 AM     No results found for: \"VITD3\"    Lab Results   Component Value Date/Time    TSH 0.89 02/07/2024 10:49 AM       None
